# Patient Record
Sex: MALE | Race: WHITE | Employment: OTHER | URBAN - METROPOLITAN AREA
[De-identification: names, ages, dates, MRNs, and addresses within clinical notes are randomized per-mention and may not be internally consistent; named-entity substitution may affect disease eponyms.]

---

## 2018-05-22 ENCOUNTER — OFFICE VISIT (OUTPATIENT)
Dept: NEUROLOGY | Age: 63
End: 2018-05-22
Payer: MEDICARE

## 2018-05-22 VITALS
OXYGEN SATURATION: 98 % | RESPIRATION RATE: 18 BRPM | HEIGHT: 74 IN | DIASTOLIC BLOOD PRESSURE: 94 MMHG | WEIGHT: 176 LBS | BODY MASS INDEX: 22.59 KG/M2 | SYSTOLIC BLOOD PRESSURE: 157 MMHG | HEART RATE: 69 BPM

## 2018-05-22 DIAGNOSIS — G31.89 COGNITIVE AND NEUROBEHAVIORAL DYSFUNCTION FOLLOWING BRAIN INJURY (HCC): Primary | ICD-10-CM

## 2018-05-22 DIAGNOSIS — S06.9XAS COGNITIVE AND NEUROBEHAVIORAL DYSFUNCTION FOLLOWING BRAIN INJURY (HCC): Primary | ICD-10-CM

## 2018-05-22 DIAGNOSIS — F09 COGNITIVE AND NEUROBEHAVIORAL DYSFUNCTION FOLLOWING BRAIN INJURY (HCC): Primary | ICD-10-CM

## 2018-05-22 PROCEDURE — 99213 OFFICE O/P EST LOW 20 MIN: CPT | Performed by: NURSE PRACTITIONER

## 2018-05-22 PROCEDURE — G8427 DOCREV CUR MEDS BY ELIG CLIN: HCPCS | Performed by: NURSE PRACTITIONER

## 2018-05-22 PROCEDURE — 3017F COLORECTAL CA SCREEN DOC REV: CPT | Performed by: NURSE PRACTITIONER

## 2018-05-22 PROCEDURE — G8420 CALC BMI NORM PARAMETERS: HCPCS | Performed by: NURSE PRACTITIONER

## 2018-05-22 PROCEDURE — 1036F TOBACCO NON-USER: CPT | Performed by: NURSE PRACTITIONER

## 2018-05-22 RX ORDER — GUAIFENESIN AND DEXTROMETHORPHAN HYDROBROMIDE 100; 10 MG/5ML; MG/5ML
10 SOLUTION ORAL EVERY 4 HOURS PRN
COMMUNITY

## 2018-05-22 RX ORDER — CLOTRIMAZOLE AND BETAMETHASONE DIPROPIONATE 10; .64 MG/G; MG/G
CREAM TOPICAL 2 TIMES DAILY
COMMUNITY
End: 2021-10-06

## 2020-09-16 ENCOUNTER — HOSPITAL ENCOUNTER (OUTPATIENT)
Age: 65
Discharge: HOME | End: 2020-09-18
Payer: COMMERCIAL

## 2020-09-16 PROCEDURE — U0003 INFECTIOUS AGENT DETECTION BY NUCLEIC ACID (DNA OR RNA); SEVERE ACUTE RESPIRATORY SYNDROME CORONAVIRUS 2 (SARS-COV-2) (CORONAVIRUS DISEASE [COVID-19]), AMPLIFIED PROBE TECHNIQUE, MAKING USE OF HIGH THROUGHPUT TECHNOLOGIES AS DESCRIBED BY CMS-2020-01-R: HCPCS

## 2020-09-18 LAB
SARS-COV-2: NOT DETECTED
SOURCE: NORMAL

## 2020-10-02 ENCOUNTER — HOSPITAL ENCOUNTER (OUTPATIENT)
Age: 65
Discharge: HOME | End: 2020-10-04
Payer: COMMERCIAL

## 2020-10-02 PROCEDURE — U0003 INFECTIOUS AGENT DETECTION BY NUCLEIC ACID (DNA OR RNA); SEVERE ACUTE RESPIRATORY SYNDROME CORONAVIRUS 2 (SARS-COV-2) (CORONAVIRUS DISEASE [COVID-19]), AMPLIFIED PROBE TECHNIQUE, MAKING USE OF HIGH THROUGHPUT TECHNOLOGIES AS DESCRIBED BY CMS-2020-01-R: HCPCS

## 2020-10-05 LAB
SARS-COV-2: NOT DETECTED
SOURCE: NORMAL

## 2020-10-06 ENCOUNTER — HOSPITAL ENCOUNTER (OUTPATIENT)
Age: 65
Discharge: HOME | End: 2020-10-08
Payer: COMMERCIAL

## 2020-10-06 PROCEDURE — U0003 INFECTIOUS AGENT DETECTION BY NUCLEIC ACID (DNA OR RNA); SEVERE ACUTE RESPIRATORY SYNDROME CORONAVIRUS 2 (SARS-COV-2) (CORONAVIRUS DISEASE [COVID-19]), AMPLIFIED PROBE TECHNIQUE, MAKING USE OF HIGH THROUGHPUT TECHNOLOGIES AS DESCRIBED BY CMS-2020-01-R: HCPCS

## 2020-10-08 LAB
SARS-COV-2: NOT DETECTED
SOURCE: NORMAL

## 2020-10-09 ENCOUNTER — HOSPITAL ENCOUNTER (OUTPATIENT)
Age: 65
Discharge: HOME | End: 2020-10-11
Payer: COMMERCIAL

## 2020-10-09 PROCEDURE — U0003 INFECTIOUS AGENT DETECTION BY NUCLEIC ACID (DNA OR RNA); SEVERE ACUTE RESPIRATORY SYNDROME CORONAVIRUS 2 (SARS-COV-2) (CORONAVIRUS DISEASE [COVID-19]), AMPLIFIED PROBE TECHNIQUE, MAKING USE OF HIGH THROUGHPUT TECHNOLOGIES AS DESCRIBED BY CMS-2020-01-R: HCPCS

## 2020-10-11 LAB
SARS-COV-2: NOT DETECTED
SOURCE: NORMAL

## 2021-04-02 ENCOUNTER — APPOINTMENT (OUTPATIENT)
Dept: GENERAL RADIOLOGY | Age: 66
End: 2021-04-02
Payer: MEDICARE

## 2021-04-02 ENCOUNTER — HOSPITAL ENCOUNTER (OUTPATIENT)
Age: 66
Setting detail: OBSERVATION
Discharge: OTHER INSTITUTION | End: 2021-04-03
Attending: EMERGENCY MEDICINE | Admitting: INTERNAL MEDICINE
Payer: MEDICARE

## 2021-04-02 ENCOUNTER — APPOINTMENT (OUTPATIENT)
Dept: MRI IMAGING | Age: 66
End: 2021-04-02
Payer: MEDICARE

## 2021-04-02 ENCOUNTER — APPOINTMENT (OUTPATIENT)
Dept: CT IMAGING | Age: 66
End: 2021-04-02
Payer: MEDICARE

## 2021-04-02 DIAGNOSIS — R29.90 STROKE-LIKE SYMPTOM: Primary | ICD-10-CM

## 2021-04-02 LAB
ALBUMIN SERPL-MCNC: 4.4 G/DL (ref 3.5–5.2)
ALP BLD-CCNC: 89 U/L (ref 40–129)
ALT SERPL-CCNC: 23 U/L (ref 0–40)
ANION GAP SERPL CALCULATED.3IONS-SCNC: 8 MMOL/L (ref 7–16)
APTT: 35.8 SEC (ref 24.5–35.1)
AST SERPL-CCNC: 24 U/L (ref 0–39)
BASOPHILS ABSOLUTE: 0.02 E9/L (ref 0–0.2)
BASOPHILS RELATIVE PERCENT: 0.2 % (ref 0–2)
BILIRUB SERPL-MCNC: 0.4 MG/DL (ref 0–1.2)
BILIRUBIN URINE: NEGATIVE
BLOOD, URINE: NEGATIVE
BUN BLDV-MCNC: 21 MG/DL (ref 8–23)
CALCIUM SERPL-MCNC: 9.3 MG/DL (ref 8.6–10.2)
CHLORIDE BLD-SCNC: 101 MMOL/L (ref 98–107)
CLARITY: CLEAR
CO2: 32 MMOL/L (ref 22–29)
COLOR: YELLOW
CREAT SERPL-MCNC: 1.2 MG/DL (ref 0.7–1.2)
EKG ATRIAL RATE: 75 BPM
EKG P AXIS: 66 DEGREES
EKG P-R INTERVAL: 158 MS
EKG Q-T INTERVAL: 412 MS
EKG QRS DURATION: 94 MS
EKG QTC CALCULATION (BAZETT): 460 MS
EKG R AXIS: 33 DEGREES
EKG T AXIS: 64 DEGREES
EKG VENTRICULAR RATE: 75 BPM
EOSINOPHILS ABSOLUTE: 0.1 E9/L (ref 0.05–0.5)
EOSINOPHILS RELATIVE PERCENT: 0.9 % (ref 0–6)
GFR AFRICAN AMERICAN: >60
GFR NON-AFRICAN AMERICAN: >60 ML/MIN/1.73
GLUCOSE BLD-MCNC: 107 MG/DL (ref 74–99)
GLUCOSE URINE: NEGATIVE MG/DL
HCT VFR BLD CALC: 49.4 % (ref 37–54)
HEMOGLOBIN: 16.4 G/DL (ref 12.5–16.5)
IMMATURE GRANULOCYTES #: 0.05 E9/L
IMMATURE GRANULOCYTES %: 0.5 % (ref 0–5)
INR BLD: 1
KETONES, URINE: NEGATIVE MG/DL
LEUKOCYTE ESTERASE, URINE: NEGATIVE
LYMPHOCYTES ABSOLUTE: 1.79 E9/L (ref 1.5–4)
LYMPHOCYTES RELATIVE PERCENT: 16.6 % (ref 20–42)
MCH RBC QN AUTO: 32 PG (ref 26–35)
MCHC RBC AUTO-ENTMCNC: 33.2 % (ref 32–34.5)
MCV RBC AUTO: 96.5 FL (ref 80–99.9)
METER GLUCOSE: 91 MG/DL (ref 74–99)
MONOCYTES ABSOLUTE: 0.97 E9/L (ref 0.1–0.95)
MONOCYTES RELATIVE PERCENT: 9 % (ref 2–12)
NEUTROPHILS ABSOLUTE: 7.85 E9/L (ref 1.8–7.3)
NEUTROPHILS RELATIVE PERCENT: 72.8 % (ref 43–80)
NITRITE, URINE: NEGATIVE
PDW BLD-RTO: 13.2 FL (ref 11.5–15)
PH UA: 6 (ref 5–9)
PLATELET # BLD: 224 E9/L (ref 130–450)
PMV BLD AUTO: 10.2 FL (ref 7–12)
POTASSIUM SERPL-SCNC: 4.6 MMOL/L (ref 3.5–5)
PROTEIN UA: NEGATIVE MG/DL
PROTHROMBIN TIME: 11.1 SEC (ref 9.3–12.4)
RBC # BLD: 5.12 E12/L (ref 3.8–5.8)
SODIUM BLD-SCNC: 141 MMOL/L (ref 132–146)
SPECIFIC GRAVITY UA: 1.01 (ref 1–1.03)
TOTAL PROTEIN: 7.2 G/DL (ref 6.4–8.3)
TROPONIN: <0.01 NG/ML (ref 0–0.03)
UROBILINOGEN, URINE: 1 E.U./DL
VALPROIC ACID LEVEL: 85 MCG/ML (ref 50–100)
WBC # BLD: 10.8 E9/L (ref 4.5–11.5)

## 2021-04-02 PROCEDURE — 85730 THROMBOPLASTIN TIME PARTIAL: CPT

## 2021-04-02 PROCEDURE — 93005 ELECTROCARDIOGRAM TRACING: CPT | Performed by: EMERGENCY MEDICINE

## 2021-04-02 PROCEDURE — G0378 HOSPITAL OBSERVATION PER HR: HCPCS

## 2021-04-02 PROCEDURE — 6360000002 HC RX W HCPCS: Performed by: INTERNAL MEDICINE

## 2021-04-02 PROCEDURE — 99285 EMERGENCY DEPT VISIT HI MDM: CPT

## 2021-04-02 PROCEDURE — 2580000003 HC RX 258: Performed by: INTERNAL MEDICINE

## 2021-04-02 PROCEDURE — 85610 PROTHROMBIN TIME: CPT

## 2021-04-02 PROCEDURE — 96372 THER/PROPH/DIAG INJ SC/IM: CPT

## 2021-04-02 PROCEDURE — 80053 COMPREHEN METABOLIC PANEL: CPT

## 2021-04-02 PROCEDURE — 85025 COMPLETE CBC W/AUTO DIFF WBC: CPT

## 2021-04-02 PROCEDURE — 6360000002 HC RX W HCPCS

## 2021-04-02 PROCEDURE — 71045 X-RAY EXAM CHEST 1 VIEW: CPT

## 2021-04-02 PROCEDURE — 82962 GLUCOSE BLOOD TEST: CPT

## 2021-04-02 PROCEDURE — 70551 MRI BRAIN STEM W/O DYE: CPT

## 2021-04-02 PROCEDURE — 84484 ASSAY OF TROPONIN QUANT: CPT

## 2021-04-02 PROCEDURE — 81003 URINALYSIS AUTO W/O SCOPE: CPT

## 2021-04-02 PROCEDURE — 96374 THER/PROPH/DIAG INJ IV PUSH: CPT

## 2021-04-02 PROCEDURE — 99204 OFFICE O/P NEW MOD 45 MIN: CPT | Performed by: PSYCHIATRY & NEUROLOGY

## 2021-04-02 PROCEDURE — 6370000000 HC RX 637 (ALT 250 FOR IP): Performed by: INTERNAL MEDICINE

## 2021-04-02 PROCEDURE — 70450 CT HEAD/BRAIN W/O DYE: CPT

## 2021-04-02 PROCEDURE — 80164 ASSAY DIPROPYLACETIC ACD TOT: CPT

## 2021-04-02 RX ORDER — SODIUM CHLORIDE 9 MG/ML
25 INJECTION, SOLUTION INTRAVENOUS PRN
Status: DISCONTINUED | OUTPATIENT
Start: 2021-04-02 | End: 2021-04-03 | Stop reason: HOSPADM

## 2021-04-02 RX ORDER — POLYETHYLENE GLYCOL 3350 17 G/17G
17 POWDER, FOR SOLUTION ORAL DAILY PRN
Status: DISCONTINUED | OUTPATIENT
Start: 2021-04-02 | End: 2021-04-03 | Stop reason: HOSPADM

## 2021-04-02 RX ORDER — DIVALPROEX SODIUM 500 MG/1
1000 TABLET, DELAYED RELEASE ORAL NIGHTLY
COMMUNITY

## 2021-04-02 RX ORDER — ATORVASTATIN CALCIUM 80 MG/1
80 TABLET, FILM COATED ORAL NIGHTLY
Status: DISCONTINUED | OUTPATIENT
Start: 2021-04-02 | End: 2021-04-03 | Stop reason: HOSPADM

## 2021-04-02 RX ORDER — IBUPROFEN 200 MG
TABLET ORAL 3 TIMES DAILY PRN
COMMUNITY

## 2021-04-02 RX ORDER — DOXEPIN HYDROCHLORIDE 25 MG/1
50 CAPSULE ORAL NIGHTLY
COMMUNITY
End: 2021-10-06

## 2021-04-02 RX ORDER — ASPIRIN 81 MG/1
81 TABLET ORAL DAILY
Status: DISCONTINUED | OUTPATIENT
Start: 2021-04-02 | End: 2021-04-03 | Stop reason: HOSPADM

## 2021-04-02 RX ORDER — DONEPEZIL HYDROCHLORIDE 10 MG/1
10 TABLET, FILM COATED ORAL DAILY
COMMUNITY

## 2021-04-02 RX ORDER — SODIUM CHLORIDE 0.9 % (FLUSH) 0.9 %
10 SYRINGE (ML) INJECTION PRN
Status: DISCONTINUED | OUTPATIENT
Start: 2021-04-02 | End: 2021-04-03 | Stop reason: HOSPADM

## 2021-04-02 RX ORDER — DROPERIDOL 2.5 MG/ML
INJECTION, SOLUTION INTRAMUSCULAR; INTRAVENOUS
Status: COMPLETED
Start: 2021-04-02 | End: 2021-04-02

## 2021-04-02 RX ORDER — LISINOPRIL 10 MG/1
10 TABLET ORAL DAILY
Status: DISCONTINUED | OUTPATIENT
Start: 2021-04-03 | End: 2021-04-03 | Stop reason: HOSPADM

## 2021-04-02 RX ORDER — OLANZAPINE 10 MG/1
10 TABLET ORAL NIGHTLY
COMMUNITY

## 2021-04-02 RX ORDER — DONEPEZIL HYDROCHLORIDE 5 MG/1
10 TABLET, FILM COATED ORAL DAILY
Status: DISCONTINUED | OUTPATIENT
Start: 2021-04-03 | End: 2021-04-03 | Stop reason: HOSPADM

## 2021-04-02 RX ORDER — GUAIFENESIN/DEXTROMETHORPHAN 100-10MG/5
10 SYRUP ORAL EVERY 4 HOURS PRN
Status: DISCONTINUED | OUTPATIENT
Start: 2021-04-02 | End: 2021-04-03 | Stop reason: HOSPADM

## 2021-04-02 RX ORDER — DIVALPROEX SODIUM 250 MG/1
750 TABLET, DELAYED RELEASE ORAL DAILY
Status: DISCONTINUED | OUTPATIENT
Start: 2021-04-03 | End: 2021-04-03 | Stop reason: HOSPADM

## 2021-04-02 RX ORDER — LISINOPRIL 10 MG/1
10 TABLET ORAL DAILY
COMMUNITY

## 2021-04-02 RX ORDER — ONDANSETRON 2 MG/ML
4 INJECTION INTRAMUSCULAR; INTRAVENOUS EVERY 6 HOURS PRN
Status: DISCONTINUED | OUTPATIENT
Start: 2021-04-02 | End: 2021-04-03 | Stop reason: HOSPADM

## 2021-04-02 RX ORDER — DOXEPIN HYDROCHLORIDE 50 MG/1
50 CAPSULE ORAL NIGHTLY
Status: DISCONTINUED | OUTPATIENT
Start: 2021-04-02 | End: 2021-04-03 | Stop reason: HOSPADM

## 2021-04-02 RX ORDER — OLANZAPINE 10 MG/1
10 TABLET ORAL NIGHTLY
Status: DISCONTINUED | OUTPATIENT
Start: 2021-04-02 | End: 2021-04-03 | Stop reason: HOSPADM

## 2021-04-02 RX ORDER — DIVALPROEX SODIUM 500 MG/1
1000 TABLET, DELAYED RELEASE ORAL NIGHTLY
Status: DISCONTINUED | OUTPATIENT
Start: 2021-04-02 | End: 2021-04-03 | Stop reason: HOSPADM

## 2021-04-02 RX ORDER — SODIUM CHLORIDE 0.9 % (FLUSH) 0.9 %
10 SYRINGE (ML) INJECTION EVERY 12 HOURS SCHEDULED
Status: DISCONTINUED | OUTPATIENT
Start: 2021-04-02 | End: 2021-04-03 | Stop reason: HOSPADM

## 2021-04-02 RX ORDER — DIVALPROEX SODIUM 500 MG/1
1000 TABLET, DELAYED RELEASE ORAL NIGHTLY
COMMUNITY
End: 2021-10-06

## 2021-04-02 RX ORDER — DROPERIDOL 2.5 MG/ML
5 INJECTION, SOLUTION INTRAMUSCULAR; INTRAVENOUS ONCE
Status: COMPLETED | OUTPATIENT
Start: 2021-04-02 | End: 2021-04-02

## 2021-04-02 RX ORDER — PROMETHAZINE HYDROCHLORIDE 25 MG/1
12.5 TABLET ORAL EVERY 6 HOURS PRN
Status: DISCONTINUED | OUTPATIENT
Start: 2021-04-02 | End: 2021-04-03 | Stop reason: HOSPADM

## 2021-04-02 RX ORDER — POLYETHYLENE GLYCOL 3350 17 G/17G
17 POWDER, FOR SOLUTION ORAL DAILY
COMMUNITY

## 2021-04-02 RX ORDER — ASPIRIN 300 MG/1
300 SUPPOSITORY RECTAL DAILY
Status: DISCONTINUED | OUTPATIENT
Start: 2021-04-02 | End: 2021-04-03 | Stop reason: HOSPADM

## 2021-04-02 RX ADMIN — ENOXAPARIN SODIUM 40 MG: 40 INJECTION SUBCUTANEOUS at 20:32

## 2021-04-02 RX ADMIN — DROPERIDOL 5 MG: 2.5 INJECTION, SOLUTION INTRAMUSCULAR; INTRAVENOUS at 11:44

## 2021-04-02 RX ADMIN — ATORVASTATIN CALCIUM 80 MG: 80 TABLET, FILM COATED ORAL at 20:58

## 2021-04-02 RX ADMIN — DOXEPIN HYDROCHLORIDE 50 MG: 50 CAPSULE ORAL at 20:59

## 2021-04-02 RX ADMIN — DIVALPROEX SODIUM 1000 MG: 250 TABLET, DELAYED RELEASE ORAL at 20:59

## 2021-04-02 RX ADMIN — SODIUM CHLORIDE, PRESERVATIVE FREE 10 ML: 5 INJECTION INTRAVENOUS at 21:00

## 2021-04-02 RX ADMIN — OLANZAPINE 10 MG: 10 TABLET, FILM COATED ORAL at 21:00

## 2021-04-02 NOTE — CONSULTS
[address]  Neurology Consult    Date:  4/2/2021  Patient Name:  Fuentes Larios  YOB: 1955  MRN: [id number]     PCP:  Gloria Black DO   Referring:  No ref. provider found      Chief Complaint: left facial weakness    History obtained from: patient, caregiver    Kandice Lindsey is a 72 y.o. male with a history of intellectual disability, dementia, and remote TBI presenting for evaluation of left facial weakness likely representing stroke vs recrudescence of prior neurologic deficits. Plan  · MRI brain pending  · Continue home dosing of VPA, donepezil  · Will start ASA for now - may discontinue if MRI brain negative  · Will follow        History of Present Illness:  Fuentes Larios is a 72 y.o. male with a history of remote TBI and intellectual disability presenting for evaluation of left facial weakness. Patient had recently been weaned off of ativan and had had episodes of generalized twitching which has since resolved. He was awoken from sleep recently and was noted to be drooling with left facial weakness. This had improved in his caregiver's opinion who is at bedside, but does appear to be present yet. Caregiver who is present states that he has spent years with the patient and thinks he is largely at his usual baseline presently.           Review of Systems:  Denies any pain  Unable to obtain full ROS due to dementia and intellectual disability    Medical History:   Past Medical History:   Diagnosis Date    Abnormality of gait     Anemia     BPH (benign prostatic hypertrophy) with urinary obstruction     CKD (chronic kidney disease)     Cognitive and neurobehavioral dysfunction following brain injury ([address]     Depressive disorder     Disturbance of conduct     DJD (degenerative joint disease)     Hypertension     Mood disorder ([address]     Osteoporosis     TBI (traumatic brain injury) ([address]         Surgical History:   Past Surgical History: Procedure Laterality Date    APPENDECTOMY      COLONOSCOPY      FRACTURE SURGERY          Family History:   History reviewed. No pertinent family history.       Social History:  Social History     Tobacco Use    Smoking status: Never Smoker    Smokeless tobacco: Never Used   Substance Use Topics    Alcohol use: No    Drug use: No        Current Medications:      Current Facility-Administered Medications   Medication Dose Route Frequency Provider Last Rate Last Admin    guaiFENesin-dextromethorphan (ROBITUSSIN DM) 100-10 MG/5ML syrup 10 mL  10 mL Oral Q4H PRN Ignacia Woods MD        [START ON 4/3/2021] divalproex (DEPAKOTE) DR tablet 750 mg  750 mg Oral Daily Ignacia Woods MD        divalproex (DEPAKOTE) DR tablet 1,000 mg  1,000 mg Oral Nightly Ignacia Woods MD        [START ON 4/3/2021] donepezil (ARICEPT) tablet 10 mg  10 mg Oral Daily Erich Cooper MD        doxepin (SINEQUAN) capsule 50 mg  50 mg Oral Nightly MD Shade Alfaro Prudent ON 4/3/2021] lisinopril (PRINIVIL;ZESTRIL) tablet 10 mg  10 mg Oral Daily Erich Cooper MD        OLANZapine (ZYPREXA) tablet 10 mg  10 mg Oral Nightly Ignacia Woods MD        sodium chloride flush 0.9 % injection 10 mL  10 mL Intravenous 2 times per day Ignacia Woods MD        sodium chloride flush 0.9 % injection 10 mL  10 mL Intravenous PRN Ignacia Woods MD        0.9 % sodium chloride infusion  25 mL Intravenous PRN Ignacia Woods MD        promethazine (PHENERGAN) tablet 12.5 mg  12.5 mg Oral Q6H PRN Ignacia Woods MD        Or    ondansetron [facility]) injection 4 mg  4 mg Intravenous Q6H PRN Ignacia Woods MD        polyethylene glycol (GLYCOLAX) packet 17 g  17 g Oral Daily PRN Ignacia Woods MD        enoxaparin (LOVENOX) injection 40 mg  40 mg Subcutaneous Daily Ignacia Woods MD        aspirin EC tablet 81 mg  81 mg Oral Daily Ignacia Woods MD        Or    aspirin suppository 300 mg  300 mg Rectal Daily Ignacia Woods MD        atorvastatin (LIPITOR) tablet 80 mg  80 mg Oral Nightly Ricardo Telles MD            Allergies:      No Known Allergies     Physical Examination  Vitals   Vitals:    04/02/21 1129 04/02/21 1201 04/02/21 1345 04/02/21 1535   BP: (!) 151/87 (!) 165/103 (!) 160/88 (!) 168/107   Pulse: 91 76 82 71   Resp: 16 18 18 18   Temp: 97.8 °F (36.6 °C)  97.9 °F (36.6 °C) 97.8 °F (36.6 °C)   TempSrc: Oral   Temporal   SpO2: 98%  99% 99%   Weight: 190 lb (86.2 kg)   166 lb 3.6 oz (75.4 kg)   Height: 6' 1\" (1.854 m)   6' 1\" (1.854 m)        General: Patient appears in no acute distress with a normal body habitus  HEENT: Normocephalic, atraumatic. Edentulous   Chest: no respiratory distress noted  Extremities: No edema or cyanosis noted    Neurologic Examination    Mental Status  Alert, and oriented to person and place. Some perseveration noted during conversation. Able to follow simple multi-step commands. Cranial Nerves  II. Visual fields full to confrontation bilaterally. III, IV, VI: Pupils equally round and reactive to light, 3 to 2 mm bilaterally. EOMs: full, no nystagmus. V. Facial sensation intact to light touch bilaterally  VII: Facial movements: mild left nasolabial fold flattening present  VIII: Hearing intact to voice  IX,X: Palate elevates symmetrically. Mild dysarthria  XI: Sternocleidomastoid and trapezius 5/5 bilaterally   XII: Tongue is midline    Motor     Right Left   Right Left   Deltoid 5 5  Hip Flexion 5 5   Biceps      5  5  Knee Extension 5 5   Triceps 5 5  Knee Flexion 5 5   Handgrip 5 5  Ankle Dorsiflexion 5 5       Ankle Plantarflexion 5 5     Tone: Normal in all four limbs    Bulk: Normal in all four limbs with no evidence of atrophy    Sensation  · Light Touch: Intact distally in all four limbs    Reflexes     Right Left   Biceps 2 2   Brachioradialis 2 2   Triceps 2 2   Patellar 1 1   Achilles 1 1   ankle clonus none none     Toes silent bilaterally.     Coordination  No resting tremors observed  Finger to nose

## 2021-04-02 NOTE — H&P
[address] [facility]  History and Physical      CHIEF COMPLAINT: Strokelike symptoms       Patient of Alis Desai DO presents with:  Strokelike symptoms    History of Present Illness:   Patient is a 79-year-old male with a past medical history of HTN, CKD, and TBI. Patient presents to the ER for strokelike symptoms. Patient is alert and oriented, with obvious left side facial droop. Due to patient's history of a TBI he is unable to answer pertinent questions. Caretaker in the room stated patient had had some drooling and twitching of his right arm over the past couple days. Patient did go to [facility] and a CAT scan was done and was told it was negative and was discharged. Caretaker admits patient is being weaned off of Ativan for the past 2 weeks. Patient answers questions with responses repetitively. Caretaker feels he is at his baseline except for the facial droop. NIH stroke scale score 2. Patient will be admitted for observation for strokelike symptoms. REVIEW OF SYSTEMS:  Pertinent negatives are above in HPI. 10 point ROS otherwise negative.       Past Medical History:   Diagnosis Date    Abnormality of gait     Anemia     BPH (benign prostatic hypertrophy) with urinary obstruction     CKD (chronic kidney disease)     [facility] and neuro[facility]al dysfunction [facility]     Depressive disorder     Disturbance of conduct     DJD (degenerative joint disease)     Hypertension     Mood disorder ([facility]     Osteoporosis     TBI (traumatic brain injury) ([facility]          Past Surgical History:   Procedure Laterality Date    APPENDECTOMY      COLONOSCOPY      FRACTURE SURGERY         Medications Prior to Admission:    Medications Prior to Admission: divalproex (DEPAKOTE) 500 MG DR tablet, Take 500 mg by mouth daily Given with Divalproex 250 mg DR total dose 750 mg  donepezil (ARICEPT) 10 MG tablet, Take 10 mg by mouth daily  polyethylene glycol (GLYCOLAX) 17 g packet, Take 17 g by mouth daily  lisinopril (PRINIVIL;ZESTRIL) 10 MG tablet, Take 10 mg by mouth daily  divalproex (DEPAKOTE) 500 MG DR tablet, Take 1,000 mg by mouth nightly  doxepin (SINEQUAN) 25 MG capsule, Take 50 mg by mouth nightly  OLANZapine (ZYPREXA) 10 MG tablet, Take 10 mg by mouth nightly  magnesium hydroxide (MILK OF MAGNESIA) 400 MG/5ML suspension, Take 30 mLs by mouth daily as needed for Constipation  neomycin-bacitracin-polymyxin (NEOSPORIN) 5-400-5000 ointment, Apply topically 3 times daily as needed (apply to minor skin abrasions/scratches until healed)  Dextromethorphan-guaiFENesin  MG/5ML SYRP, Take 10 mLs by mouth every 4 hours as needed for Cough   clotrimazole-betamethasone (LOTRISONE) 1-0.05 % cream, Apply topically 2 times daily Apply to bilateral feet  bismuth subsalicylate (PEPTO BISMOL) 262 MG/15ML suspension, Take 30 mLs by mouth every hour as needed for Indigestion, Heartburn or Nausea   sodium chloride (OCEAN, BABY AYR) 0.65 % nasal spray, 2 sprays by Nasal route every 4 hours as needed for Congestion   acetaminophen (TYLENOL) 325 MG tablet, Take 650 mg by mouth every 6 hours as needed for Pain or Fever   loratadine (CLARITIN) 10 MG tablet, Take 10 mg by mouth daily. ibandronate (BONIVA) 150 MG tablet, Take 150 mg by mouth every 30 days   meloxicam (MOBIC) 7.5 MG tablet, Take 7.5 mg by mouth daily. divalproex (DEPAKOTE) 250 MG DR tablet, Take 250 mg by mouth daily Given with Divalproex 500 mg DR total dose 750 mg    Note that the patient's home medications were reviewed and the above list is accurate to the best of my knowledge at the time of the exam.    Allergies:    Patient has no known allergies. Social History:    reports that he has never smoked. He has never used smokeless tobacco. He reports that he does not drink alcohol or use drugs.     Family History:   Unable to obtain at this time      PHYSICAL EXAM:    Vitals:  BP (!) 168/107   Pulse 71 *    63-year-old male with a past medical history of abnormality of gait, HTN, CKD, DJD, osteoporosis, TBI to be admitted observation for strokelike symptoms. 1.  MRI brain without contrast  2. Aspirin 81 mg Lipitor 40 mg daily  3. Telemetry monitoring  4. Check lipid panel and hemoglobin A1c needs tight control of risk factors. 5.  Monitor blood pressure-adjust medications as needed. 6.  Consult neurology  7. Medications for other comorbidities continue as appropriate with dosage adjustment as necessary. Code status: Full  Requires inpatient level of care  Roz Ruiz APRN-CNP  3:56 PM  4/2/2021   Mr pt   Answers questions   Mri negative   Pt at his baseline   Ok to return back to facility     I personally saw, examined and provided care for the patient. Radiographs, labs and medication list were reviewed by me independently. The case was discussed in detail and plans for care were established. Review of [address] [place] [address], documentation was conducted and revisions were made as appropriate directly by me. I agree with the above documented exam, problem list, and plan of care.      Jeni Layton MD  7:50 PM  4/2/2021

## 2021-04-02 NOTE — PROCEDURES
MRI will be on hold until pt has something to help him hold still for MRI, as RN says he may need meds bc of his MR status.

## 2021-04-02 NOTE — ED PROVIDER NOTES
Department of Emergency Medicine   ED  Provider Note  Admit Date/RoomTime: 2021 11:23 AM  ED Room:     NAME: Izaiah Tejeda  : 1955  MRN: [id number]     Chief Complaint:  Cerebrovascular Accident (Last know well 10am, left sided facial droop with drooling, slurred speech. )    History of Present Illness         Izaiah Tejeda is a 72 y.o. old male who presents to the emergency department for neurologic evaluation. He is here with his caregiver who knows him well and provides most of the history. Patient does have history of traumatic brain injury with cognitive and behavioral dysfunction. He has been on Ativan for the past couple of months for increased agitation. They have been trying to wean him down off of it over the past few weeks. He has had issues with some drooling and agitated behaviors for at least the past few days. He was twitching yesterday and staff was concerned that he may be having seizures. He went to the emergency department at [facility] and had a CAT scan and blood work. They were told it was negative and patient was discharged back to his group home. It was recommended that he have an MRI apparently at that time. Today, the patient woke up in his normal state of health. He was watching a movie with his caregiver. One of the nurses then came to check on him and woke him up. They thought his face looked funny and he was slow to respond and so he was sent to the ER for evaluation. Caregiver who is currently present feels that patient appears at his baseline. Code Status on file: Prior. .  ROS   Pertinent positives and negatives are stated within HPI, all other systems reviewed and are negative.     Past Medical History:  has a past medical history of Abnormality of gait, Anemia, BPH (benign prostatic hypertrophy) with urinary obstruction, CKD (chronic kidney disease), Cognitive and neurobehavioral dysfunction following brain injury ([facility] Depressive disorder, Disturbance of conduct, DJD (degenerative joint disease), Hypertension, Mood disorder ([facility] Osteoporosis, and TBI (traumatic brain injury) ([facility] Surgical History:  has a past surgical history that includes Colonoscopy; Appendectomy; and fracture surgery. Social History:  reports that he has never smoked. He has never used smokeless tobacco. He reports that he does not drink alcohol or use drugs. Family History: family history is not on file. Allergies: Patient has no known allergies. Physical Exam   Oxygen Saturation Interpretation: Normal.        ED Triage Vitals [04/02/21 1129]   BP Temp Temp Source Pulse Resp SpO2 Height Weight   (!) 151/87 97.8 °F (36.6 °C) Oral 91 16 98 % 6' 1\" (1.854 m) 190 lb (86.2 kg)         Constitutional:   Level of Consciousness: Awake and alert. ETOH: No.         Distress: mild. Cooperativeness: somewhat uncooperative but following simple commands. Agitated about IV placement and changing clothes  Eyes:  PERRL, EOMI, no discharge or conjunctival injection. Ears:  External ears without lesions. Throat:  Pharynx without injection, exudate, or tonsillar hypertrophy. Airway patient. Neck:  Normal ROM. Supple. Respiratory:  Clear to auscultation and breath sounds equal.  CV:  Regular rate and rhythm, normal heart sounds, without pathological murmurs, ectopy, gallops, or rubs. GI: Abdomen Soft, nontender, good bowel sounds. No firm or pulsatile mass. Back:  No costovertebral tenderness. Integument:  Normal turgor. Warm, dry, without visible rash, unless noted elsewhere. Lymphatic: no lymphadenopathy noted  Neurological:       Orientation: person. Memory:             short term impairment: Yes. Long term impairment: Yes. CN II-XII: cranial nerves II-XII are grossly intact.        Motor function:            Arm(s): Bilateral normal.            Leg(s): Bilateral normal.       Cerebellar function:            Tremor: No. Past-pointing: No.             Limb Ataxia: No.       Sensory function:            Arm(s): Bilateral normal.            Leg(s): Bilateral normal.            Face: Bilateral normal.    NIH Stroke Scale/Score at time of initial evaluation:  1A: Level of Consciousness 0 - alert; keenly responsive   1B: Ask Month and Age 1 - answers one question correctly   1C:  Tell Patient To Open and Close Eyes, then Hand  Squeeze 0 - performs both tasks correctly   2: Test Horizontal Extraocular Movements 0 - normal   3: Test Visual Fields 0 - no visual loss   4: Test Facial Palsy 1 - minor paralysis (flattened nasolabial fold, asymmetric on smiling)   5A: Test Left Arm Motor Drift 0 - no drift, limb holds 90 (or 45) degrees for full 10 seconds   5B: Test Right Arm Motor Drift 0 - no drift, limb holds 90 (or 45) degrees for full 10 seconds   6A: Test Left Leg Motor Drift 0 - no drift; leg holds 30 degree position for full 5 seconds   6B: Test Right Leg Motor Drift 0 - no drift; leg holds 30 degree position for full 5 seconds   7: Test Limb Ataxia   (FNF/Heel-Shin) 0 - absent   8: Test Sensation 0 - normal; no sensory loss   9: Test Language/Aphasia 0 - no aphasia, normal   10: Test Dysarthria 0 - normal   11: Test Extinction/Inattention 0 - no abnormality   Total Score: 2       Lab / Imaging Results   (All laboratory and radiology results have been personally reviewed by myself)  Labs:  Results for orders placed or performed during the hospital encounter of 04/02/21   CBC Auto Differential   Result Value Ref Range    WBC 10.8 4.5 - 11.5 E9/L    RBC 5.12 3.80 - 5.80 E12/L    Hemoglobin 16.4 12.5 - 16.5 g/dL    Hematocrit 49.4 37.0 - 54.0 %    MCV 96.5 80.0 - 99.9 fL    MCH 32.0 26.0 - 35.0 pg    MCHC 33.2 32.0 - 34.5 %    RDW 13.2 11.5 - 15.0 fL    Platelets 153 858 - 044 E9/L    MPV 10.2 7.0 - 12.0 fL    Neutrophils % 72.8 43.0 - 80.0 %    Immature Granulocytes % 0.5 0.0 - 5.0 %    Lymphocytes % 16.6 (L) 20.0 - 42.0 % Monocytes % 9.0 2.0 - 12.0 %    Eosinophils % 0.9 0.0 - 6.0 %    Basophils % 0.2 0.0 - 2.0 %    Neutrophils Absolute 7.85 (H) 1.80 - 7.30 E9/L    Immature Granulocytes # 0.05 E9/L    Lymphocytes Absolute 1.79 1.50 - 4.00 E9/L    Monocytes Absolute 0.97 (H) 0.10 - 0.95 E9/L    Eosinophils Absolute 0.10 0.05 - 0.50 E9/L    Basophils Absolute 0.02 0.00 - 0.20 E9/L   Comprehensive Metabolic Panel   Result Value Ref Range    Sodium 141 132 - 146 mmol/L    Potassium 4.6 3.5 - 5.0 mmol/L    Chloride 101 98 - 107 mmol/L    CO2 32 (H) 22 - 29 mmol/L    Anion Gap 8 7 - 16 mmol/L    Glucose 107 (H) 74 - 99 mg/dL    BUN 21 8 - 23 mg/dL    CREATININE 1.2 0.7 - 1.2 mg/dL    GFR Non-African American >60 >=60 mL/min/1.73    GFR African American >60     Calcium 9.3 8.6 - 10.2 mg/dL    Total Protein 7.2 6.4 - 8.3 g/dL    Albumin 4.4 3.5 - 5.2 g/dL    Total Bilirubin 0.4 0.0 - 1.2 mg/dL    Alkaline Phosphatase 89 40 - 129 U/L    ALT 23 0 - 40 U/L    AST 24 0 - 39 U/L   Troponin   Result Value Ref Range    Troponin <0.01 0.00 - 0.03 ng/mL   Protime-INR   Result Value Ref Range    Protime 11.1 9.3 - 12.4 sec    INR 1.0    APTT   Result Value Ref Range    aPTT 35.8 (H) 24.5 - 35.1 sec   Valproic acid level, total   Result Value Ref Range    Valproic Acid Lvl 85 50 - 100 mcg/mL   POCT Glucose   Result Value Ref Range    Meter Glucose 91 74 - 99 mg/dL   EKG 12 Lead   Result Value Ref Range    Ventricular Rate 75 BPM    Atrial Rate 75 BPM    P-R Interval 158 ms    QRS Duration 94 ms    Q-T Interval 412 ms    QTc Calculation (Bazett) 460 ms    P Axis 66 degrees    R Axis 33 degrees    T Axis 64 degrees     Imaging: All Radiology results interpreted by Radiologist unless otherwise noted. XR CHEST PORTABLE   Final Result   Low lung volumes. No acute cardiopulmonary disease. CT HEAD WO CONTRAST   Final Result   No acute intracranial abnormality.          MRI BRAIN WO CONTRAST    (Results Pending)     EKG #1:  Interpreted by emergency department physician unless otherwise noted. Time:  12:03    Rate: 75  Rhythm: Sinus rhythm. Interpretation: Normal sinus rhythm. ED Course / Medical Decision Making     Medications   droperidol (INAPSINE) injection 5 mg (5 mg Intravenous Given 4/2/21 1144)        Re-Evaluations:  4/2/21      Patients symptoms show no change. Consultations:             IP CONSULT TO INTERNAL MEDICINE  IP CONSULT TO NEUROLOGY    Procedures:   none    MDM: Patient presents to the ED for evaluation of at least 2 to 3 days of drooling, left facial droop and twitching episodes. He was seen at an outside ED yesterday and reportedly had a normal CAT scan and work-up. He was brought in today for recurrent symptoms and facility is requesting a neuro eval and MRI. Patient's initial NIH stroke scale score was 2. He did not meet criteria for stroke team/BHASKAR alert as his symptoms have been waxing and waning for a few days and are also very mild. Brain CT here in the ED shows no acute abnormality. Labs are unremarkable. Patient did require droperidol for sedation as he was agitated during IV placement and clothing change. Patient and caregiver updated and he was accepted by Dr. Mary Ellen Norman for further management. Plan of Care/Counseling:  I reviewed today's visit with the patient in addition to providing specific details for the plan of care and counseling regarding the diagnosis and prognosis. Questions are answered at this time and are agreeable with the plan. Assessment      1. Stroke-like symptom      This patient's ED course included: a personal history and physicial examination  This patient has remained hemodynamically stable during their ED course. Plan   Admission to Telemetry Unit. Patient condition is stable. New Medications     New Prescriptions    No medications on file     Electronically signed by Corbin Oliveira DO   DD: 4/2/21  **This report was transcribed using voice recognition software.  Every effort was made to ensure accuracy; however, inadvertent computerized transcription errors may be present.   END OF PROVIDER NOTE        Shyanne Henrdicks DO  04/02/21 4056

## 2021-04-02 NOTE — ED NOTES
Bed: 20  Expected date:   Expected time:   Means of arrival:   Comments:  anthony Cunningham RN  04/02/21 1121
yes

## 2021-04-03 VITALS
BODY MASS INDEX: 22.03 KG/M2 | RESPIRATION RATE: 18 BRPM | TEMPERATURE: 97.1 F | HEART RATE: 79 BPM | HEIGHT: 73 IN | DIASTOLIC BLOOD PRESSURE: 90 MMHG | WEIGHT: 166.23 LBS | SYSTOLIC BLOOD PRESSURE: 138 MMHG | OXYGEN SATURATION: 98 %

## 2021-04-03 LAB
CHOLESTEROL, TOTAL: 162 MG/DL (ref 0–199)
HBA1C MFR BLD: 5.2 % (ref 4–5.6)
HCT VFR BLD CALC: 50.3 % (ref 37–54)
HDLC SERPL-MCNC: 55 MG/DL
HEMOGLOBIN: 16.6 G/DL (ref 12.5–16.5)
LDL CHOLESTEROL CALCULATED: 86 MG/DL (ref 0–99)
MCH RBC QN AUTO: 31.7 PG (ref 26–35)
MCHC RBC AUTO-ENTMCNC: 33 % (ref 32–34.5)
MCV RBC AUTO: 96.2 FL (ref 80–99.9)
PDW BLD-RTO: 13.1 FL (ref 11.5–15)
PLATELET # BLD: 231 E9/L (ref 130–450)
PMV BLD AUTO: 10.6 FL (ref 7–12)
RBC # BLD: 5.23 E12/L (ref 3.8–5.8)
TRIGL SERPL-MCNC: 107 MG/DL (ref 0–149)
VLDLC SERPL CALC-MCNC: 21 MG/DL
WBC # BLD: 6.6 E9/L (ref 4.5–11.5)

## 2021-04-03 PROCEDURE — 2580000003 HC RX 258: Performed by: INTERNAL MEDICINE

## 2021-04-03 PROCEDURE — 80061 LIPID PANEL: CPT

## 2021-04-03 PROCEDURE — 83036 HEMOGLOBIN GLYCOSYLATED A1C: CPT

## 2021-04-03 PROCEDURE — 92610 EVALUATE SWALLOWING FUNCTION: CPT

## 2021-04-03 PROCEDURE — 85027 COMPLETE CBC AUTOMATED: CPT

## 2021-04-03 PROCEDURE — 36415 COLL VENOUS BLD VENIPUNCTURE: CPT

## 2021-04-03 PROCEDURE — G0378 HOSPITAL OBSERVATION PER HR: HCPCS

## 2021-04-03 PROCEDURE — 6370000000 HC RX 637 (ALT 250 FOR IP): Performed by: INTERNAL MEDICINE

## 2021-04-03 RX ADMIN — DONEPEZIL HYDROCHLORIDE 10 MG: 5 TABLET, FILM COATED ORAL at 08:58

## 2021-04-03 RX ADMIN — DIVALPROEX SODIUM 750 MG: 250 TABLET, DELAYED RELEASE ORAL at 08:58

## 2021-04-03 RX ADMIN — ASPIRIN 81 MG: 81 TABLET, COATED ORAL at 08:58

## 2021-04-03 RX ADMIN — LISINOPRIL 10 MG: 10 TABLET ORAL at 08:58

## 2021-04-03 RX ADMIN — SODIUM CHLORIDE, PRESERVATIVE FREE 10 ML: 5 INJECTION INTRAVENOUS at 08:59

## 2021-04-03 ASSESSMENT — PAIN SCALES - GENERAL: PAINLEVEL_OUTOF10: 0

## 2021-04-03 NOTE — DISCHARGE SUMMARY
Subjective: The patient is awake and alert. No acute events overnight. Denies chest pain, angina, SOB     Objective:    BP (!) 140/91   Pulse 73   Temp 96.1 °F (35.6 °C) (Temporal)   Resp 16   Ht 6' 1\" (1.854 m)   Wt 166 lb 3.6 oz (75.4 kg)   SpO2 98%   BMI 21.93 kg/m²     In: 20 [I.V.:20]  Out: 405   In: 20   Out: 405 [Urine:405]    General appearance: NAD, conversant  HEENT: AT/NC, MMM  Neck: FROM, supple  Lungs: Clear to auscultation  CV: RRR, no MRGs  Vasc: Radial pulses 2+  Abdomen: Soft, non-tender; no masses or HSM  Extremities: No peripheral edema or digital cyanosis  Skin: no rash, lesions or ulcers  Psych: Alert and oriented to person, place and time  Neuro: Alert and interactive     Recent Labs     04/02/21  1141 04/03/21  0509   WBC 10.8 6.6   HGB 16.4 16.6*   HCT 49.4 50.3    231       Recent Labs     04/02/21  1141      K 4.6      CO2 32*   BUN 21   CREATININE 1.2   CALCIUM 9.3       Assessment:    Active Problems:    Stroke-like episode  Resolved Problems:    * No resolved hospital problems.  *      Plan:    Mr pt admitted with questionable stroke  Mild left mouth droop which caregiver indicates is baseline for him and the confusion was secondary to patient awakening from a deep sleep according to her  Answers questions   Mri negative   Neurology input appreciated  Pt at his baseline   Ok to return back to facility       DVT Prophylaxis   PT/OT  Discharge Butch Lui MD  10:41 AM  4/3/2021

## 2021-04-03 NOTE — CARE COORDINATION
Care Coordination: I have attempted to call phone number listed on Face sheet from [facility], [address] [facility]. [phone] and it rings busy or that number has been changed. I have called [employer] and left message with guardian to return call and I have attempted to call Carin Lagos who is listed as contact but VM box is full. I have called [phone] And they are covering area for [facility] and suggested I call [facility] [employer] to check phone number and to call me back.  In the meantime I have sent a fax to their number to call me back as well to [phone] police or return call from fax sent    Larry Paige

## 2021-04-03 NOTE — DISCHARGE INSTR - COC
Continuity of Care Form    Patient Name: Haylee Magana   :  1955  MRN:  [id number]    Admit date:  2021  Discharge date:  4/3/21    Code Status Order: Full Code   Advance Directives:   [facility] Documentation     Date/Time Healthcare Directive Type of Healthcare Directive Copy in [facility] [address] Agent's Name Healthcare Agent's Phone Number    21 0178  Unknown, patient unable to respond due to medical condition -- -- -- -- --    21 0413  -- -- -- -- -- --          Admitting Physician:  Sriram Esposito MD  PCP: Jihan Christine DO    Discharging Nurse: [facility]/Room#: 4129/0718-Y  Discharging Unit Phone Number: [phone]    Emergency Contact:   Extended Emergency Contact Information  Primary Emergency Contact: MagdalenaMalgorzata [facility]  Address: [address] [place] [address] Phone: [phone]  Relation: Other  Secondary Emergency Contact: None,Per Pt  Relation: Other    Past Surgical History:  Past Surgical History:   Procedure Laterality Date    APPENDECTOMY      COLONOSCOPY      FRACTURE SURGERY         Immunization History: There is no immunization history on file for this patient.     Active Problems:  Patient Active Problem List   Diagnosis Code    Stroke-like episode R29.90       Isolation/Infection:   Isolation          No Isolation        Patient Infection Status     Infection Onset Added Last Indicated Last Indicated By Review Planned Expiration Resolved Resolved By    None active    Resolved    COVID-19 Rule Out 10/09/20 10/09/20 10/09/20 Covid-19 Ambulatory (Ordered)   10/11/20 Rule-Out Test Resulted    COVID-19 Rule Out 10/06/20 10/06/20 10/06/20 Covid-19 Ambulatory (Ordered)   10/08/20 Rule-Out Test Resulted    COVID-19 Rule Out 10/02/20 10/02/20 10/02/20 Covid-19 Ambulatory (Ordered)   10/05/20 Rule-Out Test Resulted    COVID-19 Rule Out 20 09/16/20 Covid-19 Ambulatory (Ordered)   09/18/20 Rule-Out Test Resulted          Nurse Assessment:  Last Vital Signs: BP (!) 140/91   Pulse 73   Temp 96.1 °F (35.6 °C) (Temporal)   Resp 16   Ht 6' 1\" (1.854 m)   Wt 166 lb 3.6 oz (75.4 kg)   SpO2 98%   BMI 21.93 kg/m²     Last documented pain score (0-10 scale): Pain Level: 0  Last Weight:   Wt Readings from Last 1 Encounters:   04/02/21 166 lb 3.6 oz (75.4 kg)     Mental Status:  disoriented and alert    IV Access:  - None    Nursing Mobility/ADLs:  Walking   Assisted  Transfer  Assisted  Bathing  Dependent  Dressing  Dependent  Toileting  Assisted  Feeding  Needs supervision  Med Admin  Assisted  Med Delivery   whole and prefers mixed with pudding or applesauce    Wound Care Documentation and Therapy:  Incision 04/25/13 Neck Left (Active)   Number of days: 2899        Elimination:  Continence:   · Bowel: No  · Bladder: No  Urinary Catheter: None   Colostomy/Ileostomy/Ileal Conduit: No       Date of Last BM: 4/1/21    Intake/Output Summary (Last 24 hours) at 4/3/2021 0923  Last data filed at 4/3/2021 0858  Gross per 24 hour   Intake 20 ml   Output 405 ml   Net -385 ml     I/O last 3 completed shifts: In: 10 [I.V.:10]  Out: [facility]    Safety Concerns: At Risk for Falls    Impairments/Disabilities:      None    Nutrition Therapy:  Current Nutrition Therapy:   - Oral Diet:  Dental Soft    Routes of Feeding: Oral  Liquids: No Restrictions  Daily Fluid Restriction: no  Last Modified Barium Swallow with Video (Video Swallowing Test): not done    Treatments at the Time of Hospital Discharge:   Respiratory Treatments: ***  Oxygen Therapy:  is not on home oxygen therapy.   Ventilator:    - No ventilator support    Rehab Therapies: Physical Therapy and Occupational Therapy  Weight Bearing Status/Restrictions: No weight bearing restirctions  Other Medical Equipment (for information only, NOT a DME order):  wheelchair, walker, bedside commode and hospital bed  Other Treatments: ***    RN SIGNATURE: Electronically signed by Austin Shields RN on 4/3/2021 at 11:42 AM      CASE MANAGEMENT/SOCIAL WORK SECTION    Inpatient Status Date: ***    Readmission Risk Assessment Score:  Readmission Risk              Risk of Unplanned Readmission:        0           Discharging to Facility/ Agency   · Name:   · Address:  · Phone:  · Fax:    Dialysis Facility (if applicable)   · Name:  · Address:  · Dialysis Schedule:  · Phone:  · Fax:    / signature: {Esignature:[id number]}    PHYSICIAN SECTION    Prognosis: {Prognosis:7318009080}    Condition at Discharge: [facility] Patient Condition:833027916}    Rehab Potential (if transferring to Rehab): {Prognosis:2533769945}    Recommended Labs or Other Treatments After Discharge: ***    Physician Certification: I certify the above information and transfer of Tonia Womack  is necessary for the continuing treatment of the diagnosis listed and that he requires {Admit to Appropriate Level of Care:97629} for {GREATER/LESS:269177622} 30 days.      Update Admission H&P: {CHP DME Changes in [facility] ERU:056202550}    PHYSICIAN SIGNATURE:  {Esignature:[id number]}

## 2021-04-03 NOTE — PLAN OF CARE
Problem: Safety:  Goal: Free from accidental physical injury  Description: Free from accidental physical injury  Outcome: Met This Shift  Goal: Free from intentional harm  Description: Free from intentional harm  Outcome: Met This Shift     Problem: Daily Care:  Goal: Daily care needs are met  Description: Daily care needs are met  Outcome: Met This Shift     Problem: Discharge Planning:  Goal: Patients continuum of care needs are met  Description: Patients continuum of care needs are met  Outcome: Met This Shift

## 2021-04-03 NOTE — PROGRESS NOTES
Sent a message via perfect serve to Dr. Blanquita Malloy regarding Inpatient Consult to Neurology for [facility].
eval      [x]The admitting diagnosis and active problem list, as listed below have been reviewed prior to initiation of this evaluation.      ADMITTING DIAGNOSIS: Stroke-like episode [R29.90]     ACTIVE PROBLEM LIST:   Patient Active Problem List   Diagnosis    Stroke-like episode

## 2021-04-03 NOTE — CARE COORDINATION
Care Coordination: Per [place] police, number incorrect on fax sheet  Number is -- I called and it goes to a generic, unpersonalized vm. I left 3 messages. I have also asked return call via fax. Still no response. I have called [facility] police again and they are sending officer to facility to ensure I get a return call    Debby Mas    Addendum: [place] police went to facility and no answer at door or phone. I called [facility], received a call back from [facility]. She left message for [facility] as well. If no answer, she advised me to call [employer] of developmental disabilities at [phone].   She will reach out to facility again and hopefull they will reach out as well    Debby Msa

## 2021-04-03 NOTE — CARE COORDINATION
Care Coordination: Unit just received a call from care giver  Katja Denton who is  a [address] [place] [address]. She will be here in one hour to pick him up. She gave her contact information to ED, but apparently this was not placed on the chart. The address on the fax is the business number and no one is there on weekends. [facility] and [facility]. Nurse is aware.  Contact information updated    Jozef Cain

## 2021-06-09 ENCOUNTER — HOSPITAL ENCOUNTER (OUTPATIENT)
Age: 66
Discharge: HOME | End: 2021-06-09
Payer: MEDICARE

## 2021-06-09 LAB
ALBUMIN SERPL-MCNC: 3.8 G/DL (ref 3.5–5.2)
ALP BLD-CCNC: 87 U/L (ref 40–129)
ALT SERPL-CCNC: 20 U/L (ref 0–40)
AMMONIA: 19 UMOL/L (ref 16–60)
AMYLASE: 121 U/L (ref 20–100)
ANION GAP SERPL CALCULATED.3IONS-SCNC: 8 MMOL/L (ref 7–16)
AST SERPL-CCNC: 20 U/L (ref 0–39)
BILIRUB SERPL-MCNC: 0.3 MG/DL (ref 0–1.2)
BUN BLDV-MCNC: 16 MG/DL (ref 6–23)
CALCIUM SERPL-MCNC: 9.4 MG/DL (ref 8.6–10.2)
CHLORIDE BLD-SCNC: 99 MMOL/L (ref 98–107)
CO2: 33 MMOL/L (ref 22–29)
CREAT SERPL-MCNC: 1 MG/DL (ref 0.7–1.2)
GFR AFRICAN AMERICAN: >60
GFR NON-AFRICAN AMERICAN: >60 ML/MIN/1.73
GLUCOSE BLD-MCNC: 84 MG/DL (ref 74–99)
LIPASE: 45 U/L (ref 13–60)
POTASSIUM SERPL-SCNC: 4 MMOL/L (ref 3.5–5)
SODIUM BLD-SCNC: 140 MMOL/L (ref 132–146)
TOTAL PROTEIN: 7.1 G/DL (ref 6.4–8.3)
VALPROIC ACID LEVEL: 69 MCG/ML (ref 50–100)

## 2021-06-09 PROCEDURE — 83690 ASSAY OF LIPASE: CPT

## 2021-06-09 PROCEDURE — 36415 COLL VENOUS BLD VENIPUNCTURE: CPT

## 2021-06-09 PROCEDURE — 82150 ASSAY OF AMYLASE: CPT

## 2021-06-09 PROCEDURE — 80053 COMPREHEN METABOLIC PANEL: CPT

## 2021-06-09 PROCEDURE — 82140 ASSAY OF AMMONIA: CPT

## 2021-06-09 PROCEDURE — 80164 ASSAY DIPROPYLACETIC ACD TOT: CPT

## 2021-08-17 ENCOUNTER — HOSPITAL ENCOUNTER (OUTPATIENT)
Dept: GENERAL RADIOLOGY | Age: 66
Discharge: HOME | End: 2021-08-19
Payer: MEDICARE

## 2021-08-17 DIAGNOSIS — R13.10 PROBLEMS WITH SWALLOWING AND MASTICATION: ICD-10-CM

## 2021-08-17 PROCEDURE — 2500000003 HC RX 250 WO HCPCS: Performed by: CHIROPRACTOR

## 2021-08-17 PROCEDURE — 92611 MOTION FLUOROSCOPY/SWALLOW: CPT | Performed by: SPEECH-LANGUAGE PATHOLOGIST

## 2021-08-17 PROCEDURE — 74230 X-RAY XM SWLNG FUNCJ C+: CPT

## 2021-08-17 RX ADMIN — BARIUM SULFATE 45 G: 0.6 CREAM ORAL at 14:18

## 2021-08-17 RX ADMIN — BARIUM SULFATE 45 ML: 400 SUSPENSION ORAL at 14:17

## 2021-08-17 RX ADMIN — BARIUM SULFATE 45 G: 0.81 POWDER, FOR SUSPENSION ORAL at 14:17

## 2021-08-17 NOTE — PROGRESS NOTES
SPEECH/LANGUAGE PATHOLOGY  VIDEOFLUOROSCOPIC STUDY OF SWALLOWING (MBS)   and PLAN OF CARE    PATIENT NAME:  Trena Akins  (male)     MRN:  [id number]    :  1955  (77 y.o.)  STATUS:  Outpatient    TODAY'S DATE:  2021  REFERRING PROVIDER:   Dr. Tesha Demarco: [place] modified barium swallow with video  Date of order:  21   REASON FOR REFERRAL: dysphagia    EVALUATING THERAPIST: Osmin Bauer SLP      RESULTS:      DYSPHAGIA DIAGNOSIS:  moderate oropharyngeal phase dysphagia      DIET RECOMMENDATIONS:  Minced and moist consistency solids (dysphagia 2) with  nectar consistency (mildly thick) liquids    FEEDING RECOMMENDATIONS:    Assistance level:  Set-up is required for all oral intake, Supervision is needed during all oral intake     Compensatory strategies recommended: Small bites/sips and Alternate solids and liquids     Discussed recommendations with nursing and/or faxed report to referring provider: Yes    SPEECH THERAPY  PLAN OF CARE   The dysphagia POC is established based on physician order and dysphagia diagnosis    Outpatient OR Home Care Skilled SLP intervention for dysphagia management is recommended 1-2 times per week to address the established treatment plan      Conditions Requiring Skilled Therapeutic Intervention for dysphagia:    Reduced pharyngeal clearing of the bolus  Reduced laryngeal closure resulting in penetration  Reduced laryngeal closure resulting in aspiration     SPECIFIC DYSPHAGIA INTERVENTIONS TO INCLUDE:     Training in positioning for improved integrity of swallow  Compensatory strategy training   Therapeutic exercises    Specific instructions for next treatment:  development and training of compensatory swallow strategies to improve airway protection and swallow function  Treatment Goals:    Short Term Goals:  Pt will implement identified compensatory swallowing strategies on 90% of opportunities or greater to improve airway protection and clearance. Initiation of the pharyngeal swallow occurred as the bolus head reached the pyriform sinuses. Soft palate elevation resulted in no bolus between the soft palate and the pharyngeal wall. Laryngeal elevation demonstrated partial superior movement of the thyroid cartilage with partial approximation of the arytenoids to the epiglottic petiole. Anterior hyoid excursion demonstrated partial anterior movement. Epiglottic movement resulted in partial inversion. Laryngeal vestibule closure was incomplete, as indicated by a narrow column of air or contrast within the laryngeal vestibule at the height of the swallow. Pharyngeal stripping wave was present but diminished. Pharyngeal contraction could not be determined due to logistical reasons not related to physiologic impairment. Pharyngoesophageal segment opening was completely distended for complete duration with no obstruction of bolus flow. Tongue base retraction allowed a collection of residue between the retracted tongue base and the posterior pharyngeal wall. A collection of residue remained within or on the pharyngeal structures Esophageal clearance in the upright position could not be assessed due to logistical reasons not related to physiologic impairment.        PENETRATION-ASPIRATION SCALE (PAS):  THIN 8 = Material enters the airway, passes below the vocal folds, and no effort is made to eject   MILDLY THICK 1 = Material does not enter the airway  MODERATELY THICK 1 = Material does not enter the airway  PUREE 1 = Material does not enter the airway  HARD SOLID 1 = Material does not enter the airway       COMPENSATORY STRATEGIES    Compensatory strategies were not attempted      STRUCTURAL/FUNCTIONAL ANOMALIES   No structural/functional anomalies were noted    CERVICAL ESOPHAGEAL STAGE :     The cervical esophagus appeared adequate          ___________    Cognition:   Within functional limits for this exam    Oral Peripheral Examination   Adequate lingual/labial strength     Current Respiratory Status   room air     Parameters of Speech Production  Respiration:  Adequate for speech production  Quality:   Within functional limits  Intensity: Within functional limits    Pain: No pain reported. EDUCATION:   The Speech Language Pathologist (SLP) completed education regarding results of evaluation and that intervention is warranted at this time. Learner: Patient  Education: Reviewed results and recommendations of this evaluation  Evaluation of Education:  Needs further instruction    This plan may be re-evaluated and revised as warranted. Evaluation Time includes thorough review of current medical information, gathering information on past medical history/social history and prior level of function, completion of standardized testing/informal observation of tasks, assessment of data and education on plan of care and goals. [x]The admitting diagnosis and active problem list, have been reviewed prior to initiation of this evaluation.     CPT Code: 36024  dysphagia study    ACTIVE PROBLEM LIST:   Patient Active Problem List   Diagnosis    Stroke-like episode       Andrez Diaz MSCCC/SLP  Speech Language Pathologist  [id number]

## 2021-09-28 NOTE — H&P
[address]                              HISTORY AND PHYSICAL    PATIENT NAME: Josiah Wan                      :        1955  MED REC NO:   [id number]                            ROOM:  ACCOUNT NO:   [de-identified]                           ADMIT DATE: 10/07/2021  PROVIDER:     Braden Montejo MD    CHIEF COMPLAINT:  Difficulty with swallowing. HISTORY OF PRESENT ILLNESS:  The patient is a 79-year-old, has  difficulty with swallowing. ALLERGIES:  None. HABITS:  Denies. CURRENT MEDICATIONS:  Depakote, olanzapine, doxepin, meloxicam.    PAST MEDICAL HISTORY:  Hypertension, mentally handicapped, seizures. PAST SURGICAL HISTORY:  None. PHYSICAL EXAMINATION:  GENERAL:  Mentally handicapped. VITAL SIGNS:  /80, pulse 72, respirations 16, and temperature  97.1. HEENT:  Oral cavity negative. NECK:  Negative. The peripheral lymph nodes are not palpable. LUNGS:  Clear to auscultation. HEART:  Heart sounds regular. ABDOMEN:  Soft. No palpable masses. RECTAL:  Negative. GENITALIA:  Deferred. EXTREMITIES:  Negative. NEUROLOGIC:  Oriented to time and space. No gross deficit. IMPRESSION:  Dysphagia. PLAN:  EGD, esophageal dilatation.         Major Mcleod MD    D: 2021 8:32:59       T: 2021 11:19:37     [email or web address]  Job#: [id number]     Doc#: [id number]    CC:

## 2021-10-06 RX ORDER — KETOCONAZOLE 20 MG/G
CREAM TOPICAL DAILY
COMMUNITY

## 2021-10-06 NOTE — PROGRESS NOTES
[address]                                                                                                                    PRE OP INSTRUCTIONS FOR  Vangie Kelley        Date: 10/6/2021    Date of surgery: 10/7/21   Arrival Time: Hospital will call you between 5pm and 7pm with your final arrival time for surgery    1. Do not eat or drink anything after midnight prior to surgery. This includes no water, chewing gum, mints or ice chips. 2. Take the following medications with a small sip of water on the morning of Surgery:  Donepazil, Claritin    3. Diabetics may take evening dose of insulin but none after midnight. If you feel symptomatic or low blood sugar morning of surgery drink 1-2 ounces of apple juice only. 4. Aspirin, Ibuprofen, Advil, Naproxen, Vitamin E and other Anti-inflammatory products should be stopped  before surgery  as directed by your physician. Take Tylenol only unless instructed otherwise by your surgeon. 5. Check with your Doctor regarding stopping Plavix, Coumadin, Lovenox, Eliquis, Effient, or other blood thinners. 6. Do not smoke,use illicit drugs and do not drink any alcoholic beverages 24 hours prior to surgery. 7. You may brush your teeth the morning of surgery. DO NOT SWALLOW WATER    8. You MUST make arrangements for a responsible adult to take you home after your surgery. You will not be allowed to leave alone or drive yourself home. It is strongly suggested someone stay with you the first 24 hrs. Your surgery will be cancelled if you do not have a ride home. 9. PEDIATRIC PATIENTS ONLY:  A parent/legal guardian must accompany a child scheduled for surgery and plan to stay at the hospital until the child is discharged. Please do not bring other children with you.     10. Please wear simple, loose fitting clothing to the hospital.  Iraisfabrice Magalis not bring valuables (money, credit cards, checkbooks, etc.) Do not wear any makeup (including no eye makeup) or nail polish on your fingers or toes. 11. DO NOT wear any jewelry or piercings on day of surgery. All body piercing jewelry must be removed. 12. Shower the night before surgery with _x__Antibacterial soap /NORM WIPES________    13. TOTAL JOINT REPLACEMENT/HYSTERECTOMY PATIENTS ONLY---Remember to bring Blood Bank bracelet to the hospital on the day of surgery. 14. If you have a Living Will and Durable Power of  for Healthcare, please bring in a copy. 15. If appropriate bring crutches, inspirex, WALKER, CANE etc... 12. Notify your Surgeon if you develop any illness between now and surgery time, cough, cold, fever, sore throat, nausea, vomiting, etc.  Please notify your surgeon if you experience dizziness, shortness of breath or blurred vision between now & the time of your surgery. 17. If you have _x__dentures, they will be removed before going to the OR; we will provide you a container. If you wear ___contact lenses or __x_glasses, they will be removed; please bring a case for them. 18. To provide excellent care visitors will be limited to 1 in the room at any given time. 19. Please bring picture ID and insurance card. 20. Sleep apnea patients need to bring CPAP AND SETTINGS to hospital on day of surgery. 21. During flu season no children under the age of 15 are permitted in the hospital for the safety of all patients. 22. Other                 Please call AMBULATORY CARE if you have any further questions.    [address]     [address]

## 2021-10-07 ENCOUNTER — ANESTHESIA EVENT (OUTPATIENT)
Dept: ENDOSCOPY | Age: 66
End: 2021-10-07
Payer: MEDICARE

## 2021-10-07 ENCOUNTER — ANESTHESIA (OUTPATIENT)
Dept: ENDOSCOPY | Age: 66
End: 2021-10-07
Payer: MEDICARE

## 2021-10-07 ENCOUNTER — HOSPITAL ENCOUNTER (OUTPATIENT)
Age: 66
Setting detail: OUTPATIENT SURGERY
Discharge: HOME | End: 2021-10-07
Attending: INTERNAL MEDICINE | Admitting: INTERNAL MEDICINE
Payer: MEDICARE

## 2021-10-07 VITALS
RESPIRATION RATE: 16 BRPM | TEMPERATURE: 97.2 F | DIASTOLIC BLOOD PRESSURE: 88 MMHG | HEIGHT: 74 IN | BODY MASS INDEX: 21.3 KG/M2 | WEIGHT: 166 LBS | HEART RATE: 61 BPM | OXYGEN SATURATION: 97 % | SYSTOLIC BLOOD PRESSURE: 144 MMHG

## 2021-10-07 VITALS — SYSTOLIC BLOOD PRESSURE: 95 MMHG | OXYGEN SATURATION: 100 % | DIASTOLIC BLOOD PRESSURE: 64 MMHG

## 2021-10-07 PROCEDURE — 2709999900 HC NON-CHARGEABLE SUPPLY: Performed by: INTERNAL MEDICINE

## 2021-10-07 PROCEDURE — 7100000011 HC PHASE II RECOVERY - ADDTL 15 MIN: Performed by: INTERNAL MEDICINE

## 2021-10-07 PROCEDURE — 6360000002 HC RX W HCPCS: Performed by: NURSE ANESTHETIST, CERTIFIED REGISTERED

## 2021-10-07 PROCEDURE — 3609012700 HC EGD DILATION SAVORY: Performed by: INTERNAL MEDICINE

## 2021-10-07 PROCEDURE — 2580000003 HC RX 258: Performed by: ANESTHESIOLOGY

## 2021-10-07 PROCEDURE — 88305 TISSUE EXAM BY PATHOLOGIST: CPT

## 2021-10-07 PROCEDURE — 3700000000 HC ANESTHESIA ATTENDED CARE: Performed by: INTERNAL MEDICINE

## 2021-10-07 PROCEDURE — 7100000010 HC PHASE II RECOVERY - FIRST 15 MIN: Performed by: INTERNAL MEDICINE

## 2021-10-07 PROCEDURE — 3609012400 HC EGD TRANSORAL BIOPSY SINGLE/MULTIPLE: Performed by: INTERNAL MEDICINE

## 2021-10-07 PROCEDURE — 3700000001 HC ADD 15 MINUTES (ANESTHESIA): Performed by: INTERNAL MEDICINE

## 2021-10-07 RX ORDER — PROPOFOL 10 MG/ML
INJECTION, EMULSION INTRAVENOUS PRN
Status: DISCONTINUED | OUTPATIENT
Start: 2021-10-07 | End: 2021-10-07 | Stop reason: SDUPTHER

## 2021-10-07 RX ORDER — SODIUM CHLORIDE 9 MG/ML
INJECTION, SOLUTION INTRAVENOUS CONTINUOUS
Status: DISCONTINUED | OUTPATIENT
Start: 2021-10-07 | End: 2021-10-07 | Stop reason: HOSPADM

## 2021-10-07 RX ADMIN — PROPOFOL 100 MG: 10 INJECTION, EMULSION INTRAVENOUS at 09:30

## 2021-10-07 RX ADMIN — SODIUM CHLORIDE: 9 INJECTION, SOLUTION INTRAVENOUS at 09:12

## 2021-10-07 RX ADMIN — PROPOFOL 50 MG: 10 INJECTION, EMULSION INTRAVENOUS at 09:35

## 2021-10-07 RX ADMIN — PROPOFOL 50 MG: 10 INJECTION, EMULSION INTRAVENOUS at 09:33

## 2021-10-07 NOTE — H&P
H&p reviewed. No changes. Blood pressure (!) 150/92, pulse 70, temperature 96.8 °F (36 °C), temperature source Infrared, resp. rate 16, height 6' 2\" (1.88 m), weight 166 lb (75.3 kg), SpO2 97 %. The patient was counseled at length about the risks of seda Covid-19 during their perioperative period and any recovery window from their procedure. The patient was made aware that seda Covid-19  may worsen their prognosis for recovering from their procedure  and lend to a higher morbidity and/or mortality risk. All material risks, benefits, and reasonable alternatives including postponing the procedure were discussed. The patient does wish to proceed with the procedure at this time.

## 2021-10-07 NOTE — ANESTHESIA PRE PROCEDURE
Department of Anesthesiology  Preprocedure Note       Name:  Varun Cabrera   Age:  77 y.o.  :  1955                                          MRN:  [id number]         Date:  10/7/2021      Surgeon: Julien Sheth):  Tammi Buck MD    Procedure: Procedure(s):  EGD ESOPHAGOGASTRODUODENOSCOPY WITH ESOPHAGEAL DILATION **DO NOT CHANGE TIME**    Medications prior to admission:   Prior to Admission medications    Medication Sig Start Date End Date Taking?  Authorizing Provider   ketoconazole (NIZORAL) 2 % cream Apply topically daily Apply topically daily to bilateral feet   Yes Historical Provider, MD   divalproex (DEPAKOTE) 500 MG DR tablet Take 1,000 mg by mouth nightly    Yes Historical Provider, MD   donepezil (ARICEPT) 10 MG tablet Take 10 mg by mouth daily   Yes Historical Provider, MD   polyethylene glycol (GLYCOLAX) 17 g packet Take 17 g by mouth daily   Yes Historical Provider, MD   lisinopril (PRINIVIL;ZESTRIL) 10 MG tablet Take 10 mg by mouth daily   Yes Historical Provider, MD   OLANZapine (ZYPREXA) 10 MG tablet Take 10 mg by mouth nightly   Yes Historical Provider, MD   magnesium hydroxide (MILK OF MAGNESIA) 400 MG/5ML suspension Take 30 mLs by mouth daily as needed for Constipation   Yes Historical Provider, MD   neomycin-bacitracin-polymyxin (NEOSPORIN) 5-400-5000 ointment Apply topically 3 times daily as needed (apply to minor skin abrasions/scratches until healed)   Yes Historical Provider, MD   Dextromethorphan-guaiFENesin  MG/5ML SYRP Take 10 mLs by mouth every 4 hours as needed for Cough    Yes Historical Provider, MD   bismuth subsalicylate (PEPTO BISMOL) 262 MG/15ML suspension Take 30 mLs by mouth every hour as needed for Indigestion, Heartburn or Nausea    Yes Historical Provider, MD   sodium chloride (OCEAN, BABY AYR) 0.65 % nasal spray 2 sprays by Nasal route every 4 hours as needed for Congestion    Yes Historical Provider, MD   acetaminophen (TYLENOL) 325 MG tablet Take 650 mg by mouth Encounters:   10/07/21 (!) 150/92   04/03/21 (!) 138/90   05/22/18 (!) 157/94       NPO Status:                                                                                 BMI:   Wt Readings from Last 3 Encounters:   10/07/21 166 lb (75.3 kg)   04/02/21 166 lb 3.6 oz (75.4 kg)   05/22/18 176 lb (79.8 kg)     Body mass index is 21.31 kg/m². CBC:   Lab Results   Component Value Date    WBC 6.6 04/03/2021    RBC 5.23 04/03/2021    HGB 16.6 04/03/2021    HCT 50.3 04/03/2021    MCV 96.2 04/03/2021    RDW 13.1 04/03/2021     04/03/2021       CMP:   Lab Results   Component Value Date     06/09/2021    K 4.0 06/09/2021    CL 99 06/09/2021    CO2 33 06/09/2021    BUN 16 06/09/2021    CREATININE 1.0 06/09/2021    GFRAA >60 06/09/2021    LABGLOM >60 06/09/2021    GLUCOSE 84 06/09/2021    PROT 7.1 06/09/2021    CALCIUM 9.4 06/09/2021    BILITOT 0.3 06/09/2021    ALKPHOS 87 06/09/2021    AST 20 06/09/2021    ALT 20 06/09/2021       POC Tests: No results for input(s): POCGLU, POCNA, POCK, POCCL, POCBUN, POCHEMO, POCHCT in the last 72 hours.     Coags:   Lab Results   Component Value Date    PROTIME 11.1 04/02/2021    INR 1.0 04/02/2021    APTT 35.8 04/02/2021       HCG (If Applicable): No results found for: PREGTESTUR, PREGSERUM, HCG, HCGQUANT     ABGs: No results found for: PHART, PO2ART, ORG6ZMT, GZY1HOV, BEART, R1BFEZMJ     Type & Screen (If Applicable):  No results found for: LABABO, LABRH    Drug/Infectious Status (If Applicable):  No results found for: HIV, HEPCAB    COVID-19 Screening (If Applicable):   Lab Results   Component Value Date    COVID19 Not Detected 10/09/2020           Anesthesia Evaluation  Patient summary reviewed and Nursing notes reviewed no history of anesthetic complications:   Airway: Mallampati: III  TM distance: >3 FB   Neck ROM: limited  Mouth opening: > = 3 FB Dental:    (+) edentulous      Pulmonary:   (+) decreased breath sounds,                            ROS comment: Seasonal

## 2021-10-07 NOTE — PROGRESS NOTES
0910 Pt in endo suite, but anesthesia consent was not obtained prior to procedure, and [facility] legal guardians were contacted via phone. [facility] contact Iain Natarajan contacted via phone, and consent obtained and verified by 2 RNs.    6072 procedure complete, pts care transferred to secondary recovery, and report sent on chart with patient for handoff.

## 2021-10-07 NOTE — OP NOTE
Operative Note      Patient: Shahla Dominguez  YOB: 1955  MRN: [id number]    Date of Procedure: 10/7/2021    Pre-Op Diagnosis: DYSPHAGIA    Post-Op Diagnosis: Moderate antral gastritis, no mechanical obstruction seen for dysphagia. Empirically  dilated with a 64 [address]       Procedure(s):  EGD BIOPSY  EGD DILATION MIRELLA    Surgeon(s):  Filiberto Minor MD    Assistant:   Surgical Assistant: Heath Milian RN    Anesthesia: Monitor Anesthesia Care    Estimated Blood Loss (mL): Minimal    Complications: None    Specimens:   ID Type Source Tests Collected by Time Destination   A : antrum biopsy r/o h pylori Antrum Antrum SURGICAL PATHOLOGY Filiberto Minor MD 10/7/2021 0935        Implants:  * No implants in log *      Drains: * No LDAs found *        Procedure:  Esophagogastroduodenoscopy  Empiric dilation with a 64 F Hair    Indication: Dysphagia     Consent:   Informed consent was obtained from the patient including and not limited to risk of perforation, aspiration of gastric contents or teeth, bleeding, infection, dental breakage, ileus, need for surgery, or worst case death. Sedation:  MAC    Estimated Blood Loss: 0 cc    Endoscope was advanced easily through mouth to second portion of duodenum      Oropharynx views are limited but grossly normal.    Esophagus:   Mucosa is normal, no mechanical source of patient's dysphagia is seen. GEJ at ~38 cm. Stomach:   Antrum moderate gastritis present biopsies taken for H. pylori    Gastric body is normal.    Retroflexed views show normal fundus and cardia. Duodenum: Bulb is normal.    Second portion of duodenum is normal.    IMPRESSION AND PLAN:     1. No mechanical source patient dysphagia seen in the esophagus. 2.  End of the procedure patient was empirically dilated 64 F Hair tolerated well. 3.  Moderate antral gastritis present biopsy taken to rule out H. pylori.     4. Follow up as outpatient in office, call [phone] to schedule for appointment. Pt was seen and procedure was performed with Dr. German Jeff  present for the entire procedure.      DO GREGORIA Robert Fellow   9:41 AM

## 2022-06-22 LAB — AMMONIA: 49 UMOL/L (ref 11–32)

## 2022-07-05 LAB — SARS-COV-2, NAA: NOT DETECTED

## 2022-07-08 LAB — SARS-COV-2, NAA: NOT DETECTED

## 2022-07-11 LAB — SARS-COV-2, NAA: NOT DETECTED

## 2022-07-13 LAB — SARS-COV-2, NAA: NOT DETECTED

## 2022-08-02 LAB
ABSOLUTE BASO #: 0 10*3/UL (ref 0–0.1)
ABSOLUTE EOS #: 0.1 10*3/UL (ref 0–0.4)
ABSOLUTE NEUT #: 5.4 10*3/UL (ref 2.3–7.9)
ALBUMIN: 2.5 GM/DL (ref 3.1–4.5)
ALP BLD-CCNC: 58 U/L (ref 45–117)
ALT SERPL-CCNC: 30 U/L (ref 12–78)
AST SERPL-CCNC: 24 IU/L (ref 3–35)
BASOPHILS %: 0.3 % (ref 0–1)
BILIRUB SERPL-MCNC: 0.2 MG/DL (ref 0.2–1)
BUN BLDV-MCNC: 16 MG/DL (ref 7–24)
CALCIUM SERPL-MCNC: 8.1 MG/DL (ref 8.5–10.5)
CHLORIDE BLD-SCNC: 101 MMOL/L (ref 98–107)
CHOLESTEROL: 115 MG/DL
CO2: 29 MMOL/L (ref 21–32)
CREAT SERPL-MCNC: 0.76 MG/DL (ref 0.7–1.3)
EOSINOPHILS %: 1.2 % (ref 1–4)
ESTIMATED AVERAGE GLUCOSE: 108
GFR AFRICAN AMERICAN: > 60 ML/MIN
GFR SERPL CREATININE-BSD FRML MDRD: >60 ML/MIN/
GLUCOSE: 57 MG/DL (ref 65–99)
HBA1C MFR BLD: 5.4 % (ref 4.8–5.6)
HCT VFR BLD CALC: 37 % (ref 42–52)
HDLC SERPL-MCNC: 61 MG/DL (ref 40–60)
HEMOGLOBIN: 12.4 G/DL (ref 14–18)
IMMATURE GRANULOCYTES #: 0 10*3/UL (ref 0–0.1)
IMMATURE GRANULOCYTES: 0.4 % (ref 0–1)
LDL CHOLESTEROL: 45 MG/DL (ref 9–159)
LYMPHOCYTE %: 18.8 % (ref 27–41)
LYMPHOCYTES # BLD: 1.5 10*3/UL (ref 1.3–4.4)
MAGNESIUM: 2.1 MG/DL (ref 1.5–2.1)
MCH RBC QN AUTO: 32.3 PG (ref 27–31)
MCHC RBC AUTO-ENTMCNC: 33.5 G/DL (ref 33–37)
MCV RBC AUTO: 96.4 FL (ref 80–94)
MONOCYTES # BLD: 0.7 10*3/UL (ref 0.1–1)
MONOCYTES %: 8.9 % (ref 3–9)
NEUTROPHILS %: 70.4 % (ref 47–73)
NUCLEATED RED BLOOD CELLS: 0 % (ref 0–0)
PDW BLD-RTO: 13.5 % (ref 0–14.5)
PHOSPHORUS: 3.1 MG/DL (ref 2.5–4.9)
PLATELET # BLD: 277 10*3/UL (ref 130–400)
PMV BLD AUTO: 9.9 FL (ref 9.6–12.3)
POTASSIUM SERPL-SCNC: 4 MMOL/L (ref 3.5–5.1)
RBC # BLD: 3.84 10*6/UL (ref 4.5–5.9)
SODIUM BLD-SCNC: 133 MMOL/L (ref 136–145)
T3 UPTAKE: 38 % (ref 31–39)
T4 TOTAL: 7.2 UG/DL (ref 4.5–12.1)
T7 FREE THYROXINE INDEX: 2.7 (ref 1.4–4.7)
TOTAL PROTEIN: 5.3 GM/DL (ref 6.4–8.2)
TRIGL SERPL-MCNC: 46 MG/DL
TSH SERPL DL<=0.05 MIU/L-ACNC: 1.69 UIU/ML (ref 0.36–4.75)
URIC ACID: 3.4 MG/DL (ref 3.5–7.2)
VALPROIC ACID LEVEL: 82.6 UG/ML (ref 50–100)
VITAMIN B-12: 1167 PG/ML (ref 247–911)
VITAMIN D 25-HYDROXY: 46.3 NG/ML (ref 30–100)
VLDLC SERPL CALC-MCNC: 9 MG/DL (ref 6–40)
WBC # BLD: 7.7 10*3/UL (ref 4.8–10.8)

## 2022-09-06 LAB — VALPROIC ACID LEVEL: 76.5 UG/ML (ref 50–100)

## 2022-09-27 LAB
BILIRUBIN: NEGATIVE
BLOOD: NEGATIVE
CLARITY: CLEAR
COLOR: YELLOW
GLUCOSE: NEGATIVE
KETONES: NORMAL
LEUKOCYTE ESTERASE, URINE: NEGATIVE
NITRITE, URINE: NEGATIVE
PH, URINE: 6.5 (ref 4.5–8)
PREALBUMIN: 27 MG/DL (ref 20–40)
PROTEIN UA: NEGATIVE
RBC UA: NORMAL RBC/HPF (ref 0–2)
SCREENING PSA: 0.99 NG/ML (ref 0–4)
SPECIFIC GRAVITY UA: 1.02 (ref 1–1.03)
UROBILINOGEN, URINE: 1 E.U./DL (ref 0–1)
WBC URINE: NORMAL WBC/HPF (ref 0–5)

## 2022-10-04 LAB
ABSOLUTE BASO #: 0 10*3/UL (ref 0–0.1)
ABSOLUTE EOS #: 0.1 10*3/UL (ref 0–0.4)
ABSOLUTE NEUT #: 4.8 10*3/UL (ref 2.3–7.9)
ALBUMIN: 2.8 GM/DL (ref 3.1–4.5)
ALP BLD-CCNC: 90 U/L (ref 45–117)
ALT SERPL-CCNC: 39 U/L (ref 12–78)
AST SERPL-CCNC: 22 IU/L (ref 3–35)
BASOPHILS %: 0.3 % (ref 0–1)
BILIRUB SERPL-MCNC: 0.3 MG/DL (ref 0.2–1)
BUN BLDV-MCNC: 23 MG/DL (ref 7–24)
CALCIUM SERPL-MCNC: 8.5 MG/DL (ref 8.5–10.5)
CHLORIDE BLD-SCNC: 97 MMOL/L (ref 98–107)
CHOLESTEROL: 121 MG/DL
CO2: 29 MMOL/L (ref 21–32)
CREAT SERPL-MCNC: 0.91 MG/DL (ref 0.7–1.3)
EOSINOPHILS %: 0.7 % (ref 1–4)
ESTIMATED AVERAGE GLUCOSE: 108
GFR AFRICAN AMERICAN: > 60 ML/MIN
GFR SERPL CREATININE-BSD FRML MDRD: >60 ML/MIN/
GLUCOSE: 85 MG/DL (ref 65–99)
HBA1C MFR BLD: 5.4 % (ref 4.8–5.6)
HCT VFR BLD CALC: 37.4 % (ref 42–52)
HDLC SERPL-MCNC: 56 MG/DL (ref 40–60)
HEMOGLOBIN: 12.8 G/DL (ref 14–18)
IMMATURE GRANULOCYTES #: 0 10*3/UL (ref 0–0.1)
IMMATURE GRANULOCYTES: 0.4 % (ref 0–1)
LDL CHOLESTEROL: 54 MG/DL (ref 9–159)
LYMPHOCYTE %: 17.4 % (ref 27–41)
LYMPHOCYTES # BLD: 1.2 10*3/UL (ref 1.3–4.4)
MAGNESIUM: 2.2 MG/DL (ref 1.5–2.1)
MCH RBC QN AUTO: 32.4 PG (ref 27–31)
MCHC RBC AUTO-ENTMCNC: 34.2 G/DL (ref 33–37)
MCV RBC AUTO: 94.7 FL (ref 80–94)
MONOCYTES # BLD: 0.7 10*3/UL (ref 0.1–1)
MONOCYTES %: 9.7 % (ref 3–9)
NEUTROPHILS %: 71.5 % (ref 47–73)
NUCLEATED RED BLOOD CELLS: 0 % (ref 0–0)
PDW BLD-RTO: 13.2 % (ref 0–14.5)
PHOSPHORUS: 3.9 MG/DL (ref 2.5–4.9)
PLATELET # BLD: 259 10*3/UL (ref 130–400)
PMV BLD AUTO: 10.3 FL (ref 9.6–12.3)
POTASSIUM SERPL-SCNC: 4.7 MMOL/L (ref 3.5–5.1)
RBC # BLD: 3.95 10*6/UL (ref 4.5–5.9)
SODIUM BLD-SCNC: 131 MMOL/L (ref 136–145)
T3 UPTAKE: 39 % (ref 31–39)
T4 TOTAL: 6.4 UG/DL (ref 4.5–12.1)
T7 FREE THYROXINE INDEX: 2.4 (ref 1.4–4.7)
TOTAL PROTEIN: 5.8 GM/DL (ref 6.4–8.2)
TRIGL SERPL-MCNC: 57 MG/DL
TSH SERPL DL<=0.05 MIU/L-ACNC: 1.4 UIU/ML (ref 0.36–4.75)
URIC ACID: 4.1 MG/DL (ref 3.5–7.2)
VALPROIC ACID LEVEL: 71.6 UG/ML (ref 50–100)
VITAMIN B-12: 673 PG/ML (ref 247–911)
VITAMIN D 25-HYDROXY: 43.1 NG/ML (ref 30–100)
VLDLC SERPL CALC-MCNC: 11 MG/DL (ref 6–40)
WBC # BLD: 6.8 10*3/UL (ref 4.8–10.8)

## 2022-10-05 LAB — AMMONIA: 55 UMOL/L (ref 11–32)

## 2022-11-29 LAB — VALPROIC ACID LEVEL: 50.5 UG/ML (ref 50–100)

## 2023-02-07 LAB
ABSOLUTE BASO #: 0 10*3/UL (ref 0–0.1)
ABSOLUTE EOS #: 0.2 10*3/UL (ref 0–0.4)
ABSOLUTE NEUT #: 3.5 10*3/UL (ref 2.3–7.9)
ALBUMIN: 3.2 GM/DL (ref 3.4–5)
ALP BLD-CCNC: 69 U/L (ref 46–116)
ALT SERPL-CCNC: 37 U/L (ref 10–49)
AST SERPL-CCNC: 29 IU/L (ref 0–34)
BASOPHILS %: 0.3 % (ref 0–1)
BILIRUB SERPL-MCNC: 0.4 MG/DL (ref 0.3–1.2)
BUN BLDV-MCNC: 14 MG/DL (ref 9–23)
CALCIUM SERPL-MCNC: 8.9 MD/DL (ref 8.7–10.4)
CHLORIDE BLD-SCNC: 98 MMOL/L (ref 98–107)
CHOLESTEROL: 128 MG/DL
CO2: 29 MMOL/L (ref 20–31)
CREAT SERPL-MCNC: 0.83 MG/DL (ref 0.7–1.3)
EOSINOPHILS %: 3.8 % (ref 1–4)
ESTIMATED AVERAGE GLUCOSE: 108
GFR AFRICAN AMERICAN: > 60 ML/MIN
GFR SERPL CREATININE-BSD FRML MDRD: >60 ML/MIN/
GLUCOSE: 89 MG/DL (ref 65–99)
HBA1C MFR BLD: 5.4 % (ref 4.8–5.6)
HCT VFR BLD CALC: 43 % (ref 42–52)
HDLC SERPL-MCNC: 54 MG/DL (ref 40–60)
HEMOGLOBIN: 14.1 G/DL (ref 14–18)
IMMATURE GRANULOCYTES #: 0 10*3/UL (ref 0–0.1)
IMMATURE GRANULOCYTES: 0.3 % (ref 0–1)
LDL CHOLESTEROL: 56 MG/DL (ref 9–159)
LYMPHOCYTE %: 25 % (ref 27–41)
LYMPHOCYTES # BLD: 1.5 10*3/UL (ref 1.3–4.4)
MAGNESIUM: 1.9 MG/DL (ref 1.6–2.6)
MCH RBC QN AUTO: 31.5 PG (ref 27–31)
MCHC RBC AUTO-ENTMCNC: 32.8 G/DL (ref 33–37)
MCV RBC AUTO: 96.2 FL (ref 80–94)
MONOCYTES # BLD: 0.6 10*3/UL (ref 0.1–1)
MONOCYTES %: 10.4 % (ref 3–9)
NEUTROPHILS %: 60.2 % (ref 47–73)
NUCLEATED RED BLOOD CELLS: 0 % (ref 0–0)
PDW BLD-RTO: 13.8 % (ref 0–14.5)
PHOSPHORUS: 4.3 MG/DL (ref 2.4–5.1)
PLATELET # BLD: 276 10*3/UL (ref 130–400)
PMV BLD AUTO: 10.2 FL (ref 9.6–12.3)
POTASSIUM SERPL-SCNC: 4.4 MMOL/L (ref 3.4–5.1)
RBC # BLD: 4.47 10*6/UL (ref 4.5–5.9)
SODIUM BLD-SCNC: 133 MMOL/L (ref 136–145)
T3 UPTAKE: 30.5 % (ref 22.4–36.7)
T4 TOTAL: 5.9 UG/DL (ref 4.5–10.9)
T7 FREE THYROXINE INDEX: 1.8 (ref 1.4–4.7)
TOTAL PROTEIN: 6.1 GM/DL (ref 6–8)
TRIGL SERPL-MCNC: 91 MG/DL
TSH SERPL DL<=0.05 MIU/L-ACNC: 2.48 UIU/ML (ref 0.55–4.78)
URIC ACID: 4.4 MG/DL (ref 3.7–9.2)
VITAMIN B-12: 779 PG/ML (ref 211–911)
VITAMIN D 25-HYDROXY: 53.8 NG/ML (ref 30–100)
VLDLC SERPL CALC-MCNC: 18 MG/DL (ref 6–40)
WBC # BLD: 5.9 10*3/UL (ref 4.8–10.8)

## 2023-02-08 LAB — AMMONIA: 36 UMOL/L (ref 11–32)

## 2023-03-21 LAB — VALPROIC ACID LEVEL: 66.8 UG/ML (ref 50–100)

## 2023-04-18 LAB — VALPROIC ACID LEVEL: 52.5 UG/ML (ref 50–100)

## 2023-05-16 LAB — VALPROIC ACID LEVEL: 58.6 UG/ML (ref 50–100)

## 2023-05-31 RX ORDER — KETOCONAZOLE 20 MG/G
CREAM TOPICAL
Qty: 30 G | Status: SHIPPED | OUTPATIENT
Start: 2023-05-31

## 2023-07-18 LAB — VALPROIC ACID LEVEL: 57.1 UG/ML (ref 50–100)

## 2023-08-01 LAB
ABSOLUTE BASO #: 0 10*3/UL (ref 0–0.1)
ABSOLUTE EOS #: 0.2 10*3/UL (ref 0–0.4)
ABSOLUTE NEUT #: 3.8 10*3/UL (ref 2.3–7.9)
ALBUMIN: 3.4 GM/DL (ref 3.4–5)
ALP BLD-CCNC: 71 U/L (ref 46–116)
ALT SERPL-CCNC: 53 U/L (ref 10–49)
AST SERPL-CCNC: 35 IU/L (ref 0–34)
BASOPHILS %: 0.2 % (ref 0–1)
BILIRUB SERPL-MCNC: 0.4 MG/DL (ref 0.3–1.2)
BUN BLDV-MCNC: 14 MG/DL (ref 9–23)
CALCIUM SERPL-MCNC: 9 MD/DL (ref 8.7–10.4)
CHLORIDE BLD-SCNC: 99 MMOL/L (ref 98–107)
CHOLESTEROL: 135 MG/DL
CO2: 30 MMOL/L (ref 20–31)
CREAT SERPL-MCNC: 0.76 MG/DL (ref 0.7–1.3)
EOSINOPHILS %: 2.9 % (ref 1–4)
ESTIMATED AVERAGE GLUCOSE: 105
GFR AFRICAN AMERICAN: > 60 ML/MIN
GFR SERPL CREATININE-BSD FRML MDRD: >60 ML/MIN/
GLUCOSE: 85 MG/DL (ref 65–99)
HBA1C MFR BLD: 5.3 % (ref 4.8–5.6)
HCT VFR BLD CALC: 43.9 % (ref 42–52)
HDLC SERPL-MCNC: 53 MG/DL (ref 40–60)
HEMOGLOBIN: 14.8 G/DL (ref 14–18)
IMMATURE GRANULOCYTES #: 0 10*3/UL (ref 0–0.1)
IMMATURE GRANULOCYTES: 0.5 % (ref 0–1)
LDL CHOLESTEROL: 64 MG/DL (ref 9–159)
LYMPHOCYTE %: 21.1 % (ref 27–41)
LYMPHOCYTES # BLD: 1.2 10*3/UL (ref 1.3–4.4)
MAGNESIUM: 2 MG/DL (ref 1.6–2.6)
MCH RBC QN AUTO: 32 PG (ref 27–31)
MCHC RBC AUTO-ENTMCNC: 33.7 G/DL (ref 33–37)
MCV RBC AUTO: 95 FL (ref 80–94)
MONOCYTES # BLD: 0.6 10*3/UL (ref 0.1–1)
MONOCYTES %: 10.3 % (ref 3–9)
NEUTROPHILS %: 65 % (ref 47–73)
NUCLEATED RED BLOOD CELLS: 0 % (ref 0–0)
PDW BLD-RTO: 13.4 % (ref 0–14.5)
PHOSPHORUS: 3.7 MG/DL (ref 2.4–5.1)
PLATELET # BLD: 230 10*3/UL (ref 130–400)
PMV BLD AUTO: 10.8 FL (ref 9.6–12.3)
POTASSIUM SERPL-SCNC: 4.1 MMOL/L (ref 3.4–5.1)
RBC # BLD: 4.62 10*6/UL (ref 4.5–5.9)
SODIUM BLD-SCNC: 132 MMOL/L (ref 136–145)
T3 UPTAKE: 28 % (ref 22.4–36.7)
T4 TOTAL: 5.5 UG/DL (ref 4.5–10.9)
T7 FREE THYROXINE INDEX: 1.5 (ref 1.4–4.7)
TOTAL PROTEIN: 6.1 GM/DL (ref 6–8)
TRIGL SERPL-MCNC: 89 MG/DL
TSH SERPL DL<=0.05 MIU/L-ACNC: 2 UIU/ML (ref 0.55–4.78)
URIC ACID: 4.7 MG/DL (ref 3.7–9.2)
VITAMIN B-12: 873 PG/ML (ref 211–911)
VITAMIN D 25-HYDROXY: 59.1 NG/ML (ref 30–100)
VLDLC SERPL CALC-MCNC: 18 MG/DL (ref 6–40)
WBC # BLD: 5.8 10*3/UL (ref 4.8–10.8)

## 2023-08-02 LAB — AMMONIA: 51 UMOL/L (ref 11–32)

## 2023-09-19 LAB — SCREENING PSA: 0.66 NG/ML (ref 0–4.5)

## 2023-09-26 LAB — SCREENING PSA: 0.88 NG/ML (ref 0–4.5)

## 2023-10-04 LAB
AMORPH SEDIMENT: ABNORMAL
BACTERIA, URINE: ABNORMAL
BILIRUBIN: NEGATIVE
BLOOD: ABNORMAL
CLARITY: ABNORMAL
COLOR: ABNORMAL
EPITHELIAL CELLS, UA: ABNORMAL
GLUCOSE: NEGATIVE
KETONES: ABNORMAL
LEUKOCYTE ESTERASE, URINE: NEGATIVE
NITRITE, URINE: NEGATIVE
PH, URINE: 8 (ref 4.5–8)
PROTEIN UA: NEGATIVE
RBC UA: ABNORMAL RBC/HPF (ref 0–2)
SPECIFIC GRAVITY UA: 1.02 (ref 1–1.03)
UROBILINOGEN, URINE: 1 E.U./DL (ref 0–1)
WBC URINE: ABNORMAL WBC/HPF (ref 0–5)

## 2024-02-06 LAB
ABSOLUTE BASO #: 0 10*3/UL (ref 0–0.1)
ABSOLUTE EOS #: 0.2 10*3/UL (ref 0–0.4)
ABSOLUTE NEUT #: 3.2 10*3/UL (ref 2.3–7.9)
ALBUMIN: 3.3 GM/DL (ref 3.4–5)
ALP BLD-CCNC: 69 U/L (ref 46–116)
ALT SERPL-CCNC: 54 U/L (ref 5–49)
AST SERPL-CCNC: 38 IU/L (ref 0–34)
BASOPHILS %: 0.4 % (ref 0–1)
BILIRUB SERPL-MCNC: 0.5 MG/DL (ref 0.3–1.2)
BUN BLDV-MCNC: 14 MG/DL (ref 9–23)
CALCIUM SERPL-MCNC: 9.1 MD/DL (ref 8.7–10.4)
CHLORIDE BLD-SCNC: 104 MMOL/L (ref 98–107)
CHOLESTEROL: 146 MG/DL
CO2: 30 MMOL/L (ref 20–31)
CREAT SERPL-MCNC: 0.79 MG/DL (ref 0.7–1.3)
EOSINOPHILS %: 4.3 % (ref 1–4)
ESTIMATED AVERAGE GLUCOSE: 103
GFR AFRICAN AMERICAN: > 60 ML/MIN
GFR SERPL CREATININE-BSD FRML MDRD: >60 ML/MIN/
GLUCOSE: 78 MG/DL (ref 65–99)
HBA1C MFR BLD: 5.2 % (ref 4.8–5.6)
HCT VFR BLD CALC: 43.2 % (ref 42–52)
HDLC SERPL-MCNC: 43 MG/DL (ref 40–60)
HEMOGLOBIN: 13.9 G/DL (ref 14–18)
IMMATURE GRANULOCYTES #: 0 10*3/UL (ref 0–0.1)
IMMATURE GRANULOCYTES: 0.4 % (ref 0–1)
LDL CHOLESTEROL: 76 MG/DL (ref 9–159)
LYMPHOCYTE %: 27.5 % (ref 27–41)
LYMPHOCYTES # BLD: 1.5 10*3/UL (ref 1.3–4.4)
MAGNESIUM: 1.9 MG/DL (ref 1.6–2.6)
MCH RBC QN AUTO: 31.8 PG (ref 27–31)
MCHC RBC AUTO-ENTMCNC: 32.2 G/DL (ref 33–37)
MCV RBC AUTO: 98.9 FL (ref 80–94)
MONOCYTES # BLD: 0.6 10*3/UL (ref 0.1–1)
MONOCYTES %: 10.1 % (ref 3–9)
NEUTROPHILS %: 57.3 % (ref 47–73)
NUCLEATED RED BLOOD CELLS: 0 % (ref 0–0)
PDW BLD-RTO: 13.4 % (ref 0–14.5)
PHOSPHORUS: 3.5 MG/DL (ref 2.4–5.1)
PLATELET # BLD: 243 10*3/UL (ref 130–400)
PMV BLD AUTO: 11.1 FL (ref 9.6–12.3)
POTASSIUM SERPL-SCNC: 4.1 MMOL/L (ref 3.4–5.1)
RBC # BLD: 4.37 10*6/UL (ref 4.5–5.9)
SODIUM BLD-SCNC: 139 MMOL/L (ref 136–145)
T3 UPTAKE: 35 % (ref 22.4–36.7)
T4 TOTAL: 4.7 UG/DL (ref 4.5–10.9)
T7 FREE THYROXINE INDEX: 1.6 (ref 1.4–4.7)
TOTAL PROTEIN: 5.9 GM/DL (ref 6–8)
TRIGL SERPL-MCNC: 135 MG/DL
TSH SERPL DL<=0.05 MIU/L-ACNC: 3.07 UIU/ML (ref 0.55–4.78)
URIC ACID: 5.6 MG/DL (ref 3.7–9.2)
VITAMIN B-12: 861 PG/ML (ref 211–911)
VITAMIN D 25-HYDROXY: 39.3 NG/ML (ref 30–100)
VLDLC SERPL CALC-MCNC: 27 MG/DL (ref 6–40)
WBC # BLD: 5.6 10*3/UL (ref 4.8–10.8)

## 2024-02-07 ENCOUNTER — TELEPHONE (OUTPATIENT)
Dept: FAMILY MEDICINE CLINIC | Age: 69
End: 2024-02-07

## 2024-02-07 LAB — AMMONIA: 68 UMOL/L (ref 11–32)

## 2024-02-07 NOTE — TELEPHONE ENCOUNTER
Lynette from [facility] lab reporting critical ammonia level- 68.  Jake is a [place] resident.   Message given to Reta  as Dr. Bey is not in the office.    Dr. Bey notified.

## 2024-03-12 LAB — AMMONIA: 55 UMOL/L (ref 11–32)

## 2024-06-11 RX ORDER — POLYETHYLENE GLYCOL 3350 17 G/17G
POWDER, FOR SOLUTION ORAL
Qty: 510 G | Refills: 11 | Status: SHIPPED | OUTPATIENT
Start: 2024-06-11

## 2024-08-06 LAB
ALBUMIN: 3.3 GM/DL (ref 3.4–5)
ALP BLD-CCNC: 84 U/L (ref 46–116)
ALT SERPL-CCNC: 57 U/L (ref 5–49)
AST SERPL-CCNC: 31 IU/L (ref 0–34)
BASOPHILS ABSOLUTE: 0 10*3/UL (ref 0–0.1)
BASOPHILS RELATIVE PERCENT: 0.2 % (ref 0–1)
BILIRUB SERPL-MCNC: 0.4 MG/DL (ref 0.3–1.2)
BUN BLDV-MCNC: 12 MG/DL (ref 9–23)
CALCIUM SERPL-MCNC: 9.4 MD/DL (ref 8.7–10.4)
CHLORIDE BLD-SCNC: 103 MMOL/L (ref 98–107)
CHOLESTEROL, TOTAL: 187 MG/DL
CO2: 31 MMOL/L (ref 20–31)
CREAT SERPL-MCNC: 1.08 MG/DL (ref 0.7–1.3)
EOSINOPHILS ABSOLUTE: 0.3 10*3/UL (ref 0–0.4)
EOSINOPHILS RELATIVE PERCENT: 4.3 % (ref 1–4)
ESTIMATED AVERAGE GLUCOSE: 108
GFR AFRICAN AMERICAN: > 60 ML/MIN
GFR, ESTIMATED: > 60 ML/MIN/
GLUCOSE: 85 MG/DL (ref 65–99)
HBA1C MFR BLD: 5.4 % (ref 4.8–5.6)
HCT VFR BLD CALC: 42.6 % (ref 42–52)
HDLC SERPL-MCNC: 45 MG/DL (ref 40–60)
HEMOGLOBIN: 14.2 G/DL (ref 14–18)
IMMATURE GRANULOCYTES %: 0.7 % (ref 0–1)
LDL CHOLESTEROL: 109 MG/DL (ref 9–159)
LYMPHOCYTES # BLD: 1.6 10*3/UL (ref 1.3–4.4)
LYMPHOCYTES RELATIVE PERCENT: 27.5 % (ref 27–41)
MAGNESIUM: 2.1 MG/DL (ref 1.6–2.6)
MCH RBC QN AUTO: 32.6 PG (ref 27–31)
MCHC RBC AUTO-ENTMCNC: 33.3 G/DL (ref 33–37)
MCV RBC AUTO: 97.9 FL (ref 80–94)
MONOCYTES RELATIVE PERCENT: 0.7 10*3/UL (ref 0.1–1)
MONOCYTES RELATIVE PERCENT: 11.4 % (ref 3–9)
NEUTROPHILS ABSOLUTE: 3.3 10*3/UL (ref 2.3–7.9)
NEUTROPHILS RELATIVE PERCENT: 55.9 % (ref 47–73)
NUCLEATED RED BLOOD CELLS: 0 % (ref 0–0)
PDW BLD-RTO: 13.6 % (ref 0–14.5)
PHOSPHORUS: 3.8 MG/DL (ref 2.4–5.1)
PLATELET # BLD: 207 10*3/UL (ref 130–400)
PMV BLD AUTO: 11 FL (ref 9.6–12.3)
POTASSIUM SERPL-SCNC: 4.4 MMOL/L (ref 3.4–5.1)
RBC # BLD: 4.35 10*6/UL (ref 4.5–5.9)
SODIUM BLD-SCNC: 138 MMOL/L (ref 136–145)
T3 UPTAKE: 37.1 % (ref 22.4–36.7)
T7 FREE THYROXINE INDEX: 1.4 (ref 1.4–4.7)
THYROXINE (T4): 3.9 UG/DL (ref 4.5–10.9)
TOTAL PROTEIN: 6.1 GM/DL (ref 6–8)
TRIGL SERPL-MCNC: 164 MG/DL
TSH SERPL DL<=0.05 MIU/L-ACNC: 2.86 UIU/ML (ref 0.55–4.78)
URIC ACID: 6.2 MG/DL (ref 3.7–9.2)
VITAMIN B-12: 771 PG/ML (ref 211–911)
VITAMIN D 25-HYDROXY: 46.5 NG/ML (ref 30–100)
VLDLC SERPL CALC-MCNC: 33 MG/DL (ref 6–40)
WBC # BLD: 5.9 10*3/UL (ref 4.8–10.8)

## 2024-08-07 LAB — AMMONIA: 63 UMOL/L (ref 11–32)

## 2024-09-04 LAB — AMMONIA: 88 UMOL/L (ref 11–32)

## 2024-10-01 LAB — SCREENING PSA: 0.75 NG/ML (ref 0–4.5)

## 2024-11-19 LAB
BUN BLDV-MCNC: 23 MG/DL (ref 9–23)
CALCIUM SERPL-MCNC: 9 MD/DL (ref 8.7–10.4)
CHLORIDE BLD-SCNC: 100 MMOL/L (ref 98–107)
CO2: 31 MMOL/L (ref 20–31)
CREAT SERPL-MCNC: 1.33 MG/DL (ref 0.7–1.3)
GFR AFRICAN AMERICAN: > 60 ML/MIN
GFR, ESTIMATED: 53 ML/MIN/
GLUCOSE: 95 MG/DL (ref 65–99)
POTASSIUM SERPL-SCNC: 4.1 MMOL/L (ref 3.4–5.1)
SODIUM BLD-SCNC: 137 MMOL/L (ref 136–145)

## 2024-12-10 LAB
BUN BLDV-MCNC: 35 MG/DL (ref 9–23)
CALCIUM SERPL-MCNC: 9.3 MD/DL (ref 8.7–10.4)
CHLORIDE BLD-SCNC: 101 MMOL/L (ref 98–107)
CO2: 31 MMOL/L (ref 20–31)
CREAT SERPL-MCNC: 1.55 MG/DL (ref 0.7–1.3)
GFR AFRICAN AMERICAN: 54 ML/MIN
GFR, ESTIMATED: 45 ML/MIN
GLUCOSE: 96 MG/DL (ref 65–99)
POTASSIUM SERPL-SCNC: 4.7 MMOL/L (ref 3.4–5.1)
SODIUM BLD-SCNC: 141 MMOL/L (ref 136–145)

## 2024-12-31 LAB
BUN BLDV-MCNC: 32 MG/DL (ref 9–23)
CALCIUM SERPL-MCNC: 9.3 MD/DL (ref 8.7–10.4)
CHLORIDE BLD-SCNC: 105 MMOL/L (ref 98–107)
CO2: 30 MMOL/L (ref 20–31)
CREAT SERPL-MCNC: 1.28 MG/DL (ref 0.7–1.3)
GFR AFRICAN AMERICAN: > 60 ML/MIN
GFR, ESTIMATED: 56 ML/MIN
GLUCOSE: 89 MG/DL (ref 65–99)
POTASSIUM SERPL-SCNC: 4.3 MMOL/L (ref 3.4–5.1)
SODIUM BLD-SCNC: 140 MMOL/L (ref 136–145)

## 2025-01-07 NOTE — TELEPHONE ENCOUNTER
Name of Medication(s) Requested:  Requested Prescriptions     Pending Prescriptions Disp Refills    diclofenac sodium (VOLTAREN) 1 % GEL [Pharmacy Med Name: DICLOFENAC SODIUM 1 % GEL 1 Gel] 100 g 5     Sig: APPLY TO BILATERAL KNEES TWICE DAILY       Medication is on current medication list Yes    Dosage and directions were verified? Yes    Quantity verified: 30 day supply     Pharmacy Verified?  Yes    Last Appointment:  Visit date not found    Future appts:  No future appointments.     (If no appt send self scheduling link. .REFILLAPPT)  Scheduling request sent?     [] Yes  [x] No    Does patient need updated?  [] Yes  [x] No

## 2025-01-21 LAB
BUN BLDV-MCNC: 35 MG/DL (ref 9–23)
CALCIUM SERPL-MCNC: 9.3 MD/DL (ref 8.7–10.4)
CHLORIDE BLD-SCNC: 101 MMOL/L (ref 98–107)
CO2: 32 MMOL/L (ref 20–31)
CREAT SERPL-MCNC: 1.4 MG/DL (ref 0.7–1.3)
GFR AFRICAN AMERICAN: > 60 ML/MIN
GFR, ESTIMATED: 50 ML/MIN
GLUCOSE: 88 MG/DL (ref 65–99)
POTASSIUM SERPL-SCNC: 4.6 MMOL/L (ref 3.4–5.1)
SODIUM BLD-SCNC: 139 MMOL/L (ref 136–145)

## 2025-02-18 LAB
ALBUMIN: 3.1 GM/DL (ref 3.4–5)
ALP BLD-CCNC: 92 U/L (ref 46–116)
ALT SERPL-CCNC: 36 U/L (ref 5–49)
AST SERPL-CCNC: 28 IU/L (ref 0–34)
BASOPHILS ABSOLUTE: 0 10*3/UL (ref 0–0.1)
BASOPHILS RELATIVE PERCENT: 0.3 % (ref 0–1)
BILIRUB SERPL-MCNC: 0.2 MG/DL (ref 0.3–1.2)
BUN BLDV-MCNC: 22 MG/DL (ref 9–23)
CALCIUM SERPL-MCNC: 9.1 MD/DL (ref 8.7–10.4)
CHLORIDE BLD-SCNC: 105 MMOL/L (ref 98–107)
CHOLESTEROL, TOTAL: 141 MG/DL
CO2: 29 MMOL/L (ref 20–31)
CREAT SERPL-MCNC: 1.2 MG/DL (ref 0.7–1.3)
EOSINOPHILS ABSOLUTE: 0.1 10*3/UL (ref 0–0.4)
EOSINOPHILS RELATIVE PERCENT: 1.6 % (ref 1–4)
ESTIMATED AVERAGE GLUCOSE: 108
GFR AFRICAN AMERICAN: > 60 ML/MIN
GFR, ESTIMATED: 60 ML/MIN
GLUCOSE: 105 MG/DL (ref 65–99)
HBA1C MFR BLD: 5.4 % (ref 4.8–5.6)
HCT VFR BLD CALC: 35.1 % (ref 42–52)
HDLC SERPL-MCNC: 39 MG/DL (ref 40–60)
HEMOGLOBIN: 11.4 G/DL (ref 14–18)
IMMATURE GRANULOCYTES #: 0.1 10*3/UL (ref 0–0.1)
IMMATURE GRANULOCYTES %: 1.3 % (ref 0–1)
LDL CHOLESTEROL: 71 MG/DL (ref 9–159)
LYMPHOCYTES ABSOLUTE: 1.5 10*3/UL (ref 1.3–4.4)
LYMPHOCYTES RELATIVE PERCENT: 22.1 % (ref 27–41)
MAGNESIUM: 2.1 MG/DL (ref 1.6–2.6)
MCH RBC QN AUTO: 33.7 PG (ref 27–31)
MCHC RBC AUTO-ENTMCNC: 32.5 G/DL (ref 33–37)
MCV RBC AUTO: 103.8 FL (ref 80–94)
MONOCYTES RELATIVE PERCENT: 0.7 10*3/UL (ref 0.1–1)
MONOCYTES RELATIVE PERCENT: 10.9 % (ref 3–9)
NEUTROPHILS ABSOLUTE: 4.3 10*3/UL (ref 2.3–7.9)
NEUTROPHILS RELATIVE PERCENT: 63.8 % (ref 47–73)
NUCLEATED RED BLOOD CELLS: 0 % (ref 0–0)
PDW BLD-RTO: 13.7 % (ref 0–14.5)
PHOSPHORUS: 3.3 MG/DL (ref 2.4–5.1)
PLATELET # BLD: 299 10*3/UL (ref 130–400)
PMV BLD AUTO: 10.9 FL (ref 9.6–12.3)
POTASSIUM SERPL-SCNC: 4.3 MMOL/L (ref 3.4–5.1)
RBC # BLD: 3.38 10*6/UL (ref 4.5–5.9)
SODIUM BLD-SCNC: 140 MMOL/L (ref 136–145)
T3 UPTAKE: 41.4 % (ref 22.4–36.7)
T7 FREE THYROXINE INDEX: 2.2 (ref 1.4–4.7)
THYROXINE (T4): 5.4 UG/DL (ref 4.5–10.9)
TOTAL PROTEIN: 5.7 GM/DL (ref 6–8)
TRIGL SERPL-MCNC: 153 MG/DL
TSH SERPL DL<=0.05 MIU/L-ACNC: 2.89 UIU/ML (ref 0.55–4.78)
URIC ACID: 7.5 MG/DL (ref 3.7–9.2)
VITAMIN B-12: 787 PG/ML (ref 211–911)
VITAMIN D 25-HYDROXY: 50.8 NG/ML (ref 30–100)
VLDLC SERPL CALC-MCNC: 31 MG/DL (ref 6–40)
WBC # BLD: 6.7 10*3/UL (ref 4.8–10.8)

## 2025-02-19 LAB — AMMONIA: 69 UMOL/L (ref 11–32)

## 2025-03-04 LAB
BUN BLDV-MCNC: 28 MG/DL (ref 9–23)
CALCIUM SERPL-MCNC: 9.4 MD/DL (ref 8.7–10.4)
CHLORIDE BLD-SCNC: 104 MMOL/L (ref 98–107)
CO2: 30 MMOL/L (ref 20–31)
CREAT SERPL-MCNC: 1.23 MG/DL (ref 0.7–1.3)
GFR AFRICAN AMERICAN: > 60 ML/MIN
GFR, ESTIMATED: 58 ML/MIN
GLUCOSE: 92 MG/DL (ref 65–99)
POTASSIUM SERPL-SCNC: 4.8 MMOL/L (ref 3.4–5.1)
SODIUM BLD-SCNC: 140 MMOL/L (ref 136–145)

## 2025-03-18 LAB
BUN BLDV-MCNC: 26 MG/DL (ref 9–23)
CALCIUM SERPL-MCNC: 9.7 MD/DL (ref 8.7–10.4)
CHLORIDE BLD-SCNC: 100 MMOL/L (ref 98–107)
CO2: 31 MMOL/L (ref 20–31)
CREAT SERPL-MCNC: 1.25 MG/DL (ref 0.7–1.3)
GFR AFRICAN AMERICAN: > 60 ML/MIN
GFR, ESTIMATED: 57 ML/MIN
GLUCOSE: 90 MG/DL (ref 65–99)
POTASSIUM SERPL-SCNC: 4.4 MMOL/L (ref 3.4–5.1)
SODIUM BLD-SCNC: 138 MMOL/L (ref 136–145)

## 2025-04-01 LAB
BUN BLDV-MCNC: 24 MG/DL (ref 9–23)
CALCIUM SERPL-MCNC: 9.7 MD/DL (ref 8.7–10.4)
CHLORIDE BLD-SCNC: 99 MMOL/L (ref 98–107)
CO2: 32 MMOL/L (ref 20–31)
CREAT SERPL-MCNC: 1.22 MG/DL (ref 0.7–1.3)
GFR AFRICAN AMERICAN: > 60 ML/MIN
GFR, ESTIMATED: 59 ML/MIN
GLUCOSE: 91 MG/DL (ref 65–99)
POTASSIUM SERPL-SCNC: 4.7 MMOL/L (ref 3.4–5.1)
SODIUM BLD-SCNC: 137 MMOL/L (ref 136–145)

## 2025-04-08 LAB — VALPROIC ACID LEVEL: 50.3 UG/ML (ref 50–100)

## 2025-04-15 LAB
BUN BLDV-MCNC: 31 MG/DL (ref 9–23)
CALCIUM SERPL-MCNC: 9.5 MD/DL (ref 8.7–10.4)
CHLORIDE BLD-SCNC: 98 MMOL/L (ref 98–107)
CO2: 31 MMOL/L (ref 20–31)
CREAT SERPL-MCNC: 1.51 MG/DL (ref 0.7–1.3)
GFR AFRICAN AMERICAN: 56 ML/MIN
GFR, ESTIMATED: 46 ML/MIN
GLUCOSE: 74 MG/DL (ref 65–99)
POTASSIUM SERPL-SCNC: 5 MMOL/L (ref 3.4–5.1)
SODIUM BLD-SCNC: 139 MMOL/L (ref 136–145)

## 2025-04-29 LAB
BUN BLDV-MCNC: 40 MG/DL (ref 9–23)
CALCIUM SERPL-MCNC: 9.4 MD/DL (ref 8.7–10.4)
CHLORIDE BLD-SCNC: 102 MMOL/L (ref 98–107)
CO2: 29 MMOL/L (ref 20–31)
CREAT SERPL-MCNC: 1.77 MG/DL (ref 0.7–1.3)
GFR AFRICAN AMERICAN: 46 ML/MIN
GFR, ESTIMATED: 38 ML/MIN
GLUCOSE: 111 MG/DL (ref 65–99)
POTASSIUM SERPL-SCNC: 4.9 MMOL/L (ref 3.4–5.1)
SODIUM BLD-SCNC: 140 MMOL/L (ref 136–145)

## 2025-05-12 RX ORDER — POLYETHYLENE GLYCOL 3350 17 G/17G
POWDER, FOR SOLUTION ORAL
Qty: 510 G | Refills: 5 | Status: SHIPPED | OUTPATIENT
Start: 2025-05-12

## 2025-05-12 NOTE — TELEPHONE ENCOUNTER
Name of Medication(s) Requested:  Requested Prescriptions     Pending Prescriptions Disp Refills    GOODSENSE CLEARLAX 17 GM/SCOOP powder [Pharmacy Med Name: CLEARLAX POWDER 17 Powder]       Sig: GIVE 17 GRAMS (CAPFUL) IN 8 OZ OF BEVERAGE OF CHOICE ONCE DAILY       Medication is on current medication list Yes    Dosage and directions were verified? Yes    Quantity verified: 30 day supply     Pharmacy Verified?  Yes    Last Appointment:  Visit date not found    Future appts:  No future appointments.     (If no appt send self scheduling link. .REFILLAPPT)  Scheduling request sent?     [] Yes  [x] No    Does patient need updated?  [] Yes  [x] No

## 2025-05-13 LAB
BUN BLDV-MCNC: 25 MG/DL (ref 9–23)
CALCIUM SERPL-MCNC: 9 MD/DL (ref 8.7–10.4)
CHLORIDE BLD-SCNC: 100 MMOL/L (ref 98–107)
CO2: 29 MMOL/L (ref 20–31)
CREAT SERPL-MCNC: 1.24 MG/DL (ref 0.7–1.3)
GFR AFRICAN AMERICAN: > 60 ML/MIN
GFR, ESTIMATED: 58 ML/MIN
GLUCOSE: 88 MG/DL (ref 65–99)
POTASSIUM SERPL-SCNC: 4.6 MMOL/L (ref 3.4–5.1)
SODIUM BLD-SCNC: 139 MMOL/L (ref 136–145)
VALPROIC ACID LEVEL: 48 UG/ML (ref 50–100)

## 2025-05-20 LAB
ALBUMIN: 3 GM/DL (ref 3.4–5)
ALP BLD-CCNC: 110 U/L (ref 46–116)
ALT SERPL-CCNC: 81 U/L (ref 5–49)
AST SERPL-CCNC: 34 IU/L (ref 0–34)
BILIRUB SERPL-MCNC: 0.2 MG/DL (ref 0.3–1.2)
BUN BLDV-MCNC: 27 MG/DL (ref 9–23)
CALCIUM SERPL-MCNC: 9.2 MD/DL (ref 8.7–10.4)
CHLORIDE BLD-SCNC: 100 MMOL/L (ref 98–107)
CO2: 30 MMOL/L (ref 20–31)
CREAT SERPL-MCNC: 1.4 MG/DL (ref 0.7–1.3)
GFR AFRICAN AMERICAN: > 60 ML/MIN
GFR, ESTIMATED: 50 ML/MIN
GLUCOSE: 111 MG/DL (ref 65–99)
POTASSIUM SERPL-SCNC: 4.5 MMOL/L (ref 3.4–5.1)
SODIUM BLD-SCNC: 136 MMOL/L (ref 136–145)
TOTAL PROTEIN: 5.5 GM/DL (ref 6–8)

## 2025-05-20 RX ORDER — RIVASTIGMINE 13.3 MG/24H
1 PATCH, EXTENDED RELEASE TRANSDERMAL DAILY
Qty: 30 PATCH | Refills: 12 | Status: SHIPPED | OUTPATIENT
Start: 2025-05-20

## 2025-05-27 LAB
BUN BLDV-MCNC: 20 MG/DL (ref 9–23)
CALCIUM SERPL-MCNC: 9.1 MD/DL (ref 8.7–10.4)
CHLORIDE BLD-SCNC: 99 MMOL/L (ref 98–107)
CO2: 31 MMOL/L (ref 20–31)
CREAT SERPL-MCNC: 1.18 MG/DL (ref 0.7–1.3)
GFR AFRICAN AMERICAN: > 60 ML/MIN
GFR, ESTIMATED: > 60 ML/MIN
GLUCOSE: 86 MG/DL (ref 65–99)
POTASSIUM SERPL-SCNC: 4.4 MMOL/L (ref 3.4–5.1)
SODIUM BLD-SCNC: 137 MMOL/L (ref 136–145)

## 2025-06-10 LAB
BUN BLDV-MCNC: 22 MG/DL (ref 9–23)
CALCIUM SERPL-MCNC: 8.8 MD/DL (ref 8.7–10.4)
CHLORIDE BLD-SCNC: 97 MMOL/L (ref 98–107)
CO2: 32 MMOL/L (ref 20–31)
CREAT SERPL-MCNC: 1.2 MG/DL (ref 0.7–1.3)
GFR AFRICAN AMERICAN: > 60 ML/MIN
GFR, ESTIMATED: 60 ML/MIN
GLUCOSE: 82 MG/DL (ref 65–99)
POTASSIUM SERPL-SCNC: 4.3 MMOL/L (ref 3.4–5.1)
SODIUM BLD-SCNC: 135 MMOL/L (ref 136–145)
VALPROIC ACID LEVEL: 54.4 UG/ML (ref 50–100)

## 2025-06-25 LAB
ALBUMIN: 3.3 GM/DL (ref 3.4–5)
ALP BLD-CCNC: 93 U/L (ref 46–116)
ALT SERPL-CCNC: 49 U/L (ref 5–49)
AST SERPL-CCNC: 36 IU/L (ref 0–34)
BILIRUB SERPL-MCNC: 0.3 MG/DL (ref 0.3–1.2)
BUN BLDV-MCNC: 16 MG/DL (ref 9–23)
CALCIUM SERPL-MCNC: 9.2 MD/DL (ref 8.7–10.4)
CHLORIDE BLD-SCNC: 96 MMOL/L (ref 98–107)
CO2: 29 MMOL/L (ref 20–31)
CREAT SERPL-MCNC: 1.02 MG/DL (ref 0.7–1.3)
GFR AFRICAN AMERICAN: > 60 ML/MIN
GFR, ESTIMATED: > 60 ML/MIN
GLUCOSE: 86 MG/DL (ref 65–99)
POTASSIUM SERPL-SCNC: 4.7 MMOL/L (ref 3.4–5.1)
SODIUM BLD-SCNC: 135 MMOL/L (ref 136–145)
TOTAL PROTEIN: 5.9 GM/DL (ref 6–8)

## 2025-06-25 RX ORDER — KETOCONAZOLE 20 MG/G
CREAM TOPICAL
Qty: 30 G | Status: SHIPPED | OUTPATIENT
Start: 2025-06-25

## 2025-06-25 NOTE — TELEPHONE ENCOUNTER
Name of Medication(s) Requested:  Requested Prescriptions     Pending Prescriptions Disp Refills    ketoconazole (NIZORAL) 2 % cream [Pharmacy Med Name: KETOCONAZOLE 2 % CREA 2 Cream] 30 g PRN     Sig: APPLY TOPICALLY AFFECTED AREAS ON BILATERAL FEET AND WEBSPACES DAILY       Medication is on current medication list Yes    Dosage and directions were verified? Yes    Quantity verified: 30 day supply     Pharmacy Verified?  Yes    Last Appointment:  Visit date not found    Future appts:  No future appointments.     (If no appt send self scheduling link. .REFILLAPPT)  Scheduling request sent?     [] Yes  [x] No    Does patient need updated?  [] Yes  [x] No

## 2025-06-26 LAB — AMMONIA: 78 UMOL/L (ref 11–32)

## 2025-07-07 RX ORDER — AMMONIUM LACTATE 12 G/100G
LOTION TOPICAL
Qty: 225 G | Refills: 5 | Status: SHIPPED | OUTPATIENT
Start: 2025-07-07

## 2025-07-08 LAB
BUN BLDV-MCNC: 21 MG/DL (ref 9–23)
CALCIUM SERPL-MCNC: 9 MD/DL (ref 8.7–10.4)
CHLORIDE BLD-SCNC: 98 MMOL/L (ref 98–107)
CO2: 29 MMOL/L (ref 20–31)
CREAT SERPL-MCNC: 1.27 MG/DL (ref 0.7–1.3)
GFR AFRICAN AMERICAN: > 60 ML/MIN
GFR, ESTIMATED: 56 ML/MIN
GLUCOSE: 92 MG/DL (ref 65–99)
POTASSIUM SERPL-SCNC: 4.2 MMOL/L (ref 3.4–5.1)
SODIUM BLD-SCNC: 139 MMOL/L (ref 136–145)

## 2025-08-19 LAB
ALBUMIN: 3.3 GM/DL (ref 3.4–5)
ALP BLD-CCNC: 96 U/L (ref 46–116)
ALT SERPL-CCNC: 48 U/L (ref 5–49)
AST SERPL-CCNC: 28 IU/L (ref 0–34)
BASOPHILS ABSOLUTE: 0 10*3/UL (ref 0–0.1)
BASOPHILS RELATIVE PERCENT: 0.3 % (ref 0–1)
BILIRUB SERPL-MCNC: 0.3 MG/DL (ref 0.3–1.2)
BUN BLDV-MCNC: 24 MG/DL (ref 9–23)
CALCIUM SERPL-MCNC: 9 MD/DL (ref 8.7–10.4)
CHLORIDE BLD-SCNC: 97 MMOL/L (ref 98–107)
CHOLESTEROL, TOTAL: 172 MG/DL
CO2: 30 MMOL/L (ref 20–31)
CREAT SERPL-MCNC: 1.49 MG/DL (ref 0.7–1.3)
EOSINOPHILS ABSOLUTE: 0.2 10*3/UL (ref 0–0.4)
EOSINOPHILS RELATIVE PERCENT: 2.4 % (ref 1–4)
ESTIMATED AVERAGE GLUCOSE: 120
GFR AFRICAN AMERICAN: 57 ML/MIN
GFR, ESTIMATED: 47 ML/MIN
GLUCOSE: 85 MG/DL (ref 65–99)
HBA1C MFR BLD: 5.8 % (ref 4.8–5.6)
HCT VFR BLD CALC: 39.2 % (ref 42–52)
HDLC SERPL-MCNC: 42 MG/DL (ref 40–60)
HEMOGLOBIN: 12.9 G/DL (ref 14–18)
IMMATURE GRANULOCYTES #: 0.1 10*3/UL (ref 0–0.1)
IMMATURE GRANULOCYTES %: 0.9 % (ref 0–1)
LDL CHOLESTEROL: 77 MG/DL (ref 9–159)
LYMPHOCYTES ABSOLUTE: 1.7 10*3/UL (ref 1.3–4.4)
LYMPHOCYTES RELATIVE PERCENT: 25.4 % (ref 27–41)
MAGNESIUM: 2 MG/DL (ref 1.6–2.6)
MCH RBC QN AUTO: 32.7 PG (ref 27–31)
MCHC RBC AUTO-ENTMCNC: 32.9 G/DL (ref 33–37)
MCV RBC AUTO: 99.5 FL (ref 80–94)
MONOCYTES RELATIVE PERCENT: 0.7 10*3/UL (ref 0.1–1)
MONOCYTES RELATIVE PERCENT: 10.7 % (ref 3–9)
NEUTROPHILS ABSOLUTE: 4 10*3/UL (ref 2.3–7.9)
NEUTROPHILS RELATIVE PERCENT: 60.3 % (ref 47–73)
NUCLEATED RED BLOOD CELLS: 0 % (ref 0–0)
PDW BLD-RTO: 13.2 % (ref 0–14.5)
PHOSPHORUS: 4 MG/DL (ref 2.4–5.1)
PLATELET # BLD: 237 10*3/UL (ref 130–400)
PMV BLD AUTO: 11.4 FL (ref 9.6–12.3)
POTASSIUM SERPL-SCNC: 4.9 MMOL/L (ref 3.4–5.1)
RBC # BLD: 3.94 10*6/UL (ref 4.5–5.9)
SODIUM BLD-SCNC: 133 MMOL/L (ref 136–145)
T3 UPTAKE: 35.5 % (ref 22.4–36.7)
T7 FREE THYROXINE INDEX: 1.7 (ref 1.4–4.7)
THYROXINE (T4): 4.8 UG/DL (ref 4.5–10.9)
TOTAL PROTEIN: 5.8 G/L (ref 5.7–8.2)
TRIGL SERPL-MCNC: 267 MG/DL
TSH SERPL DL<=0.05 MIU/L-ACNC: 2.44 UIU/ML (ref 0.55–4.78)
URIC ACID: 7.8 MG/DL (ref 3.7–9.2)
VITAMIN B-12: 745 PG/ML (ref 211–911)
VITAMIN D 25-HYDROXY: 57.4 NG/ML (ref 30–100)
VLDLC SERPL CALC-MCNC: 53 MG/DL (ref 6–40)
WBC # BLD: 6.7 10*3/UL (ref 4.8–10.8)

## 2025-08-20 LAB — AMMONIA: 60 UMOL/L (ref 11–32)

## 2025-09-02 LAB
BUN BLDV-MCNC: 14 MG/DL (ref 9–23)
CALCIUM SERPL-MCNC: 9.1 MD/DL (ref 8.7–10.4)
CHLORIDE BLD-SCNC: 98 MMOL/L (ref 98–107)
CO2: 29 MMOL/L (ref 20–31)
CREAT SERPL-MCNC: 1.02 MG/DL (ref 0.7–1.3)
GFR AFRICAN AMERICAN: > 60 ML/MIN
GFR, ESTIMATED: > 60 ML/MIN
GLUCOSE: 106 MG/DL (ref 65–99)
POTASSIUM SERPL-SCNC: 4.5 MMOL/L (ref 3.4–5.1)
SODIUM BLD-SCNC: 135 MMOL/L (ref 136–145)

## (undated) DEVICE — TUBING, SUCTION, 1/4" X 10', STRAIGHT: Brand: MEDLINE

## (undated) DEVICE — FORCEPS BX L240CM JAW DIA2.8MM L CAP W/ NDL MIC MESH TOOTH

## (undated) DEVICE — LUBRICANT SURG JELLY ST BACTER TUBE 4.25OZ

## (undated) DEVICE — YANKAUER,BULB TIP,W/O VENT,RIGID,STERILE: Brand: MEDLINE

## (undated) DEVICE — BLOCK BITE 60FR CAREGUARD

## (undated) DEVICE — KIT BEDSIDE REVITAL OX 500ML

## (undated) DEVICE — SPONGE GZ 4IN 4IN 4 PLY N WVN AVANT

## (undated) DEVICE — Device: Brand: DEFENDO VALVE AND CONNECTOR KIT

## (undated) DEVICE — CONTAINER SPEC COLL 960ML POLYPR TRIANG GRAD INTAKE/OUTPUT

## (undated) DEVICE — MASK,FACE,MAXFLUIDPROTECT,SHIELD/ERLPS: Brand: MEDLINE

## (undated) DEVICE — KENDALL 450 SERIES MONITORING FOAM ELECTRODE - RECTANGULAR SHAPE ( 3/PK): Brand: KENDALL

## (undated) DEVICE — 6 X 9  1.75MIL 4-WALL LABGUARD: Brand: MINIGRIP COMMERCIAL LLC

## (undated) DEVICE — GOWN ISOLATN REG YEL M WT MULTIPLY SIDETIE LEV 2